# Patient Record
Sex: FEMALE | Race: WHITE | NOT HISPANIC OR LATINO | Employment: PART TIME | URBAN - NONMETROPOLITAN AREA
[De-identification: names, ages, dates, MRNs, and addresses within clinical notes are randomized per-mention and may not be internally consistent; named-entity substitution may affect disease eponyms.]

---

## 2018-12-27 ENCOUNTER — OFFICE VISIT (OUTPATIENT)
Dept: FAMILY MEDICINE CLINIC | Facility: CLINIC | Age: 23
End: 2018-12-27
Payer: COMMERCIAL

## 2018-12-27 VITALS
HEIGHT: 61 IN | WEIGHT: 163.4 LBS | SYSTOLIC BLOOD PRESSURE: 124 MMHG | BODY MASS INDEX: 30.85 KG/M2 | DIASTOLIC BLOOD PRESSURE: 82 MMHG

## 2018-12-27 DIAGNOSIS — Z02.4 DRIVER'S PERMIT PE (PHYSICAL EXAMINATION): ICD-10-CM

## 2018-12-27 DIAGNOSIS — Z23 NEED FOR VACCINATION: ICD-10-CM

## 2018-12-27 DIAGNOSIS — F33.1 MODERATE EPISODE OF RECURRENT MAJOR DEPRESSIVE DISORDER (HCC): Primary | ICD-10-CM

## 2018-12-27 DIAGNOSIS — Z00.00 WELL ADULT EXAM: ICD-10-CM

## 2018-12-27 PROCEDURE — 90471 IMMUNIZATION ADMIN: CPT | Performed by: PHYSICIAN ASSISTANT

## 2018-12-27 PROCEDURE — 99385 PREV VISIT NEW AGE 18-39: CPT | Performed by: PHYSICIAN ASSISTANT

## 2018-12-27 PROCEDURE — 90715 TDAP VACCINE 7 YRS/> IM: CPT | Performed by: PHYSICIAN ASSISTANT

## 2018-12-27 NOTE — PROGRESS NOTES
Assessment/Plan:    Will start Cathy on Sertraline for MDD  I've asked her to come back in 1 month for a recheck OV or sooner if needed  Diagnoses and all orders for this visit:    Moderate episode of recurrent major depressive disorder (HCC)  -     sertraline (ZOLOFT) 50 mg tablet; Take 1 tablet (50 mg total) by mouth daily    Well adult exam    Need for vaccination  -     TDAP VACCINE GREATER THAN OR EQUAL TO 8YO IM          Subjective:      Patient ID: Osvaldo Hughes is a 21 y o  female  Lei Hickman is here today to become an established patient in our office  She is requesting a 's permit physical today as well as talking about MDD since the death of her mother 3 years ago  Lei Hickman has both good and bad days, but she feels that the bad days are slowly overtaking the good  She has a 3year old son at home to raise  She's never had SI/HI  The following portions of the patient's history were reviewed and updated as appropriate:   She  has a past medical history of Asthma  She   Patient Active Problem List    Diagnosis Date Noted    Moderate episode of recurrent major depressive disorder ([facility] [address] ) 12/27/2018     She  has no past surgical history on file  Her family history includes Asthma in her mother; Diabetes in her mother; Other in her maternal grandmother and mother  She  reports that she has never smoked  She has never used smokeless tobacco  She reports that she does not drink alcohol or use drugs  Current Outpatient Prescriptions   Medication Sig Dispense Refill    sertraline (ZOLOFT) 50 mg tablet Take 1 tablet (50 mg total) by mouth daily 30 tablet 0     No current facility-administered medications for this visit        Current Outpatient Prescriptions on File Prior to Visit   Medication Sig    [DISCONTINUED] acetaminophen (TYLENOL) 325 mg tablet Take 2 tablets by mouth every 6 (six) hours as needed for mild pain or fever    [DISCONTINUED] ibuprofen (MOTRIN) 600 mg tablet Take 1 tablet by mouth every 6 (six) hours as needed for mild pain for up to 7 days     No current facility-administered medications on file prior to visit  She is allergic to cefdinir; ceftin [cefuroxime]; dust mite extract; niacin; nystatin; and pollen extract       Review of Systems   Constitutional: Negative for activity change, appetite change, chills, diaphoresis, fatigue, fever and unexpected weight change  HENT: Negative for congestion, ear pain, postnasal drip, rhinorrhea, sinus pain, sinus pressure, sneezing, sore throat, tinnitus and voice change  Eyes: Negative for pain, redness and visual disturbance  Respiratory: Negative for cough, chest tightness, shortness of breath and wheezing  Cardiovascular: Negative for chest pain, palpitations and leg swelling  Gastrointestinal: Negative for abdominal pain, blood in stool, constipation, diarrhea, nausea and vomiting  Genitourinary: Negative for difficulty urinating, dysuria, frequency, hematuria and urgency  Musculoskeletal: Negative for arthralgias, back pain, gait problem, joint swelling, myalgias, neck pain and neck stiffness  Skin: Negative for color change, pallor, rash and wound  Neurological: Negative for dizziness, tremors, weakness, light-headedness and headaches  Psychiatric/Behavioral: Positive for dysphoric mood  Negative for self-injury, sleep disturbance and suicidal ideas  The patient is not nervous/anxious  Objective:      /82 (BP Location: Left arm, Patient Position: Sitting)   Ht 5' 1" (1 549 m)   Wt 74 1 kg (163 lb 6 4 oz)   BMI 30 87 kg/m²          Physical Exam   Constitutional: She is oriented to person, place, and time  She appears well-developed and well-nourished  No distress  HENT:   Head: Normocephalic and atraumatic     Right Ear: Hearing, tympanic membrane, external ear and ear canal normal    Left Ear: Hearing, tympanic membrane, external ear and ear canal normal    Mouth/Throat: Uvula is midline, oropharynx is clear and moist and mucous membranes are normal  No oropharyngeal exudate  Eyes: Conjunctivae are normal  Right eye exhibits no discharge  Left eye exhibits no discharge  No scleral icterus  Neck: Neck supple  Carotid bruit is not present  No thyromegaly present  Cardiovascular: Normal rate, regular rhythm and normal heart sounds  No murmur heard  Pulmonary/Chest: Effort normal and breath sounds normal  No respiratory distress  She has no wheezes  Abdominal: Soft  Bowel sounds are normal  She exhibits no distension and no mass  There is no tenderness  There is no rebound and no guarding  Musculoskeletal: Normal range of motion  She exhibits no edema or tenderness  Lymphadenopathy:     She has no cervical adenopathy  Neurological: She is alert and oriented to person, place, and time  Skin: Skin is warm and dry  No rash noted  She is not diaphoretic  No erythema  Psychiatric: She has a normal mood and affect  Her behavior is normal  Judgment and thought content normal    Vitals reviewed

## 2019-01-29 ENCOUNTER — APPOINTMENT (OUTPATIENT)
Dept: RADIOLOGY | Facility: MEDICAL CENTER | Age: 24
End: 2019-01-29
Payer: COMMERCIAL

## 2019-01-29 ENCOUNTER — OFFICE VISIT (OUTPATIENT)
Dept: FAMILY MEDICINE CLINIC | Facility: CLINIC | Age: 24
End: 2019-01-29
Payer: COMMERCIAL

## 2019-01-29 ENCOUNTER — TRANSCRIBE ORDERS (OUTPATIENT)
Dept: RADIOLOGY | Facility: MEDICAL CENTER | Age: 24
End: 2019-01-29

## 2019-01-29 VITALS
WEIGHT: 166.4 LBS | HEIGHT: 61 IN | SYSTOLIC BLOOD PRESSURE: 134 MMHG | DIASTOLIC BLOOD PRESSURE: 78 MMHG | BODY MASS INDEX: 31.42 KG/M2

## 2019-01-29 DIAGNOSIS — F33.1 MODERATE EPISODE OF RECURRENT MAJOR DEPRESSIVE DISORDER (HCC): ICD-10-CM

## 2019-01-29 DIAGNOSIS — M79.641 RIGHT HAND PAIN: ICD-10-CM

## 2019-01-29 DIAGNOSIS — M79.641 RIGHT HAND PAIN: Primary | ICD-10-CM

## 2019-01-29 PROCEDURE — 99214 OFFICE O/P EST MOD 30 MIN: CPT | Performed by: PHYSICIAN ASSISTANT

## 2019-01-29 PROCEDURE — 3008F BODY MASS INDEX DOCD: CPT | Performed by: PHYSICIAN ASSISTANT

## 2019-01-29 PROCEDURE — 73130 X-RAY EXAM OF HAND: CPT

## 2019-01-29 NOTE — PROGRESS NOTES
Assessment/Plan:    Will check imaging R hand and continue Zoloft 50 mg daily  Return to office in 3 months or sooner PRN  Will call her with results of Xrays  Diagnoses and all orders for this visit:    Right hand pain  -     XR hand 3+ vw right; Future    Moderate episode of recurrent major depressive disorder (HCC)  -     sertraline (ZOLOFT) 50 mg tablet; Take 1 tablet (50 mg total) by mouth daily          Subjective:      Patient ID: Oren Carmona is a 21 y o  female  Guerda Sanchez is here today for a 1 month follow up for depression  She's been taking Zoloft 50 mg daily without adverse effect  She is doing extremely well with the Zoloft and feels that she has not been depressed "at all" since starting the Zoloft  Also, she complains of 2 weeks of R thumb pain  She fixes machinery at work  Denies having any specific injury/trauma to the thumb  Movement/palpation at base of R thumb makes pain worse  Area has not swelled or turned ecchymotic  The following portions of the patient's history were reviewed and updated as appropriate:   She  has a past medical history of Asthma  She   Patient Active Problem List    Diagnosis Date Noted    Moderate episode of recurrent major depressive disorder ([facility] [address] ) 2018     She  has a past surgical history that includes  section ()  Her family history includes Asthma in her mother; Diabetes in her mother; Other in her maternal grandmother and mother  She  reports that she has never smoked  She has never used smokeless tobacco  She reports that she does not drink alcohol or use drugs  Current Outpatient Prescriptions   Medication Sig Dispense Refill    sertraline (ZOLOFT) 50 mg tablet Take 1 tablet (50 mg total) by mouth daily 90 tablet 0     No current facility-administered medications for this visit        Current Outpatient Prescriptions on File Prior to Visit   Medication Sig    [DISCONTINUED] sertraline (ZOLOFT) 50 mg tablet Take 1 tablet (50 mg total) by mouth daily     No current facility-administered medications on file prior to visit  She is allergic to cefdinir; ceftin [cefuroxime]; dust mite extract; niacin; nystatin; and pollen extract       Review of Systems   Constitutional: Negative for activity change, appetite change, chills, diaphoresis, fatigue, fever and unexpected weight change  HENT: Negative for congestion, ear pain, postnasal drip, rhinorrhea, sinus pain, sinus pressure, sneezing, sore throat, tinnitus and voice change  Eyes: Negative for pain, redness and visual disturbance  Respiratory: Negative for cough, chest tightness, shortness of breath and wheezing  Cardiovascular: Negative for chest pain, palpitations and leg swelling  Gastrointestinal: Negative for abdominal pain, blood in stool, constipation, diarrhea, nausea and vomiting  Genitourinary: Negative for difficulty urinating, dysuria, frequency, hematuria and urgency  Musculoskeletal: Positive for arthralgias  Negative for back pain, gait problem, joint swelling, myalgias, neck pain and neck stiffness  Skin: Negative for color change, pallor, rash and wound  Neurological: Negative for dizziness, tremors, weakness, light-headedness and headaches  Psychiatric/Behavioral: Negative for dysphoric mood, self-injury, sleep disturbance and suicidal ideas  The patient is not nervous/anxious  Objective:      /78 (BP Location: Left arm, Patient Position: Sitting)   Ht 5' 1" (1 549 m)   Wt 75 5 kg (166 lb 6 4 oz)   BMI 31 44 kg/m²          Physical Exam   Constitutional: She is oriented to person, place, and time  She appears well-developed and well-nourished  No distress  HENT:   Head: Normocephalic and atraumatic     Right Ear: Hearing, tympanic membrane, external ear and ear canal normal    Left Ear: Hearing, tympanic membrane, external ear and ear canal normal    Mouth/Throat: Uvula is midline, oropharynx is clear and moist and mucous membranes are normal  No oropharyngeal exudate  Eyes: Conjunctivae are normal  Right eye exhibits no discharge  Left eye exhibits no discharge  No scleral icterus  Neck: Neck supple  Carotid bruit is not present  No thyromegaly present  Cardiovascular: Normal rate, regular rhythm and normal heart sounds  No murmur heard  Pulmonary/Chest: Effort normal and breath sounds normal  No respiratory distress  She has no wheezes  Abdominal: Soft  Bowel sounds are normal  She exhibits no distension and no mass  There is no tenderness  There is no rebound and no guarding  Musculoskeletal: Normal range of motion  She exhibits no edema  Right hand: She exhibits tenderness and bony tenderness  She exhibits normal range of motion and no swelling  Normal sensation noted  Normal strength noted  Hands:  Lymphadenopathy:     She has no cervical adenopathy  Neurological: She is alert and oriented to person, place, and time  Skin: Skin is warm and dry  No rash noted  She is not diaphoretic  No erythema  Psychiatric: She has a normal mood and affect  Her behavior is normal  Judgment and thought content normal    Vitals reviewed

## 2019-02-08 DIAGNOSIS — M79.641 RIGHT HAND PAIN: Primary | ICD-10-CM

## 2019-03-15 ENCOUNTER — APPOINTMENT (EMERGENCY)
Dept: CT IMAGING | Facility: HOSPITAL | Age: 24
End: 2019-03-15
Payer: COMMERCIAL

## 2019-03-15 ENCOUNTER — HOSPITAL ENCOUNTER (EMERGENCY)
Facility: HOSPITAL | Age: 24
Discharge: HOME | End: 2019-03-15
Attending: EMERGENCY MEDICINE | Admitting: EMERGENCY MEDICINE
Payer: COMMERCIAL

## 2019-03-15 VITALS
RESPIRATION RATE: 14 BRPM | DIASTOLIC BLOOD PRESSURE: 68 MMHG | HEIGHT: 61 IN | SYSTOLIC BLOOD PRESSURE: 111 MMHG | OXYGEN SATURATION: 98 % | BODY MASS INDEX: 30.78 KG/M2 | WEIGHT: 163 LBS | HEART RATE: 91 BPM | TEMPERATURE: 98.4 F

## 2019-03-15 DIAGNOSIS — R51.9 HEADACHE: ICD-10-CM

## 2019-03-15 DIAGNOSIS — S06.0X9A CONCUSSION: Primary | ICD-10-CM

## 2019-03-15 PROCEDURE — 70450 CT HEAD/BRAIN W/O DYE: CPT

## 2019-03-15 PROCEDURE — 99284 EMERGENCY DEPT VISIT MOD MDM: CPT

## 2019-03-15 RX ORDER — ONDANSETRON 4 MG/1
4 TABLET, ORALLY DISINTEGRATING ORAL ONCE
Status: COMPLETED | OUTPATIENT
Start: 2019-03-15 | End: 2019-03-15

## 2019-03-15 RX ORDER — ACETAMINOPHEN 325 MG/1
650 TABLET ORAL ONCE
Status: COMPLETED | OUTPATIENT
Start: 2019-03-15 | End: 2019-03-15

## 2019-03-15 RX ORDER — ONDANSETRON 4 MG/1
4 TABLET, FILM COATED ORAL EVERY 8 HOURS PRN
Qty: 12 TABLET | Refills: 0 | Status: SHIPPED | OUTPATIENT
Start: 2019-03-15 | End: 2020-10-29

## 2019-03-15 RX ADMIN — ACETAMINOPHEN 650 MG: 325 TABLET, FILM COATED ORAL at 20:07

## 2019-03-15 RX ADMIN — ONDANSETRON 4 MG: 4 TABLET, ORALLY DISINTEGRATING ORAL at 20:07
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
## 2019-05-01 ENCOUNTER — TELEPHONE (OUTPATIENT)
Dept: FAMILY MEDICINE CLINIC | Facility: CLINIC | Age: 24
End: 2019-05-01

## 2020-03-11 ENCOUNTER — HOSPITAL ENCOUNTER (EMERGENCY)
Facility: HOSPITAL | Age: 25
Discharge: HOME | End: 2020-03-11
Attending: EMERGENCY MEDICINE | Admitting: EMERGENCY MEDICINE
Payer: COMMERCIAL

## 2020-03-11 VITALS
TEMPERATURE: 97.9 F | BODY MASS INDEX: 29.66 KG/M2 | OXYGEN SATURATION: 98 % | WEIGHT: 157.13 LBS | DIASTOLIC BLOOD PRESSURE: 81 MMHG | HEIGHT: 61 IN | HEART RATE: 89 BPM | RESPIRATION RATE: 18 BRPM | SYSTOLIC BLOOD PRESSURE: 131 MMHG

## 2020-03-11 DIAGNOSIS — F33.1 MODERATE EPISODE OF RECURRENT MAJOR DEPRESSIVE DISORDER (HCC): ICD-10-CM

## 2020-03-11 DIAGNOSIS — S06.0X9A CONCUSSION: ICD-10-CM

## 2020-03-11 DIAGNOSIS — L25.9 CONTACT DERMATITIS: Primary | ICD-10-CM

## 2020-03-11 DIAGNOSIS — R51.9 HEADACHE: ICD-10-CM

## 2020-03-11 PROCEDURE — 99282 EMERGENCY DEPT VISIT SF MDM: CPT

## 2020-03-11 PROCEDURE — 99284 EMERGENCY DEPT VISIT MOD MDM: CPT | Performed by: PHYSICIAN ASSISTANT

## 2020-03-11 RX ORDER — PREDNISONE 20 MG/1
40 TABLET ORAL DAILY
Qty: 10 TABLET | Refills: 0 | Status: SHIPPED | OUTPATIENT
Start: 2020-03-11 | End: 2020-03-16

## 2020-03-11 RX ORDER — DIPHENHYDRAMINE HCL 25 MG
25 TABLET ORAL EVERY 6 HOURS
Qty: 20 TABLET | Refills: 0 | Status: SHIPPED | OUTPATIENT
Start: 2020-03-11 | End: 2021-01-25

## 2020-03-11 RX ORDER — PREDNISONE 20 MG/1
40 TABLET ORAL DAILY
Qty: 10 TABLET | Refills: 0 | Status: SHIPPED | OUTPATIENT
Start: 2020-03-11 | End: 2020-03-11 | Stop reason: SDUPTHER

## 2020-03-11 RX ORDER — DIPHENHYDRAMINE HCL 25 MG
25 TABLET ORAL EVERY 6 HOURS
Qty: 20 TABLET | Refills: 0 | Status: SHIPPED | OUTPATIENT
Start: 2020-03-11 | End: 2020-03-11 | Stop reason: SDUPTHER

## 2020-03-11 NOTE — ED PROVIDER NOTES
History  Chief Complaint   Patient presents with    Rash     Pt reports rash to lower anterior neck taht started Friday  Pt reports rash to forearms slight, and up towards face  Pt denies any new soaps, meds or other exposure  Pt reports rash itching and no longer does  20-year-old female presenting today with a pruritic rash over the past 5 days that started on her arms now is on her chest and face  States that the rash will come and go  She has had this before in the past when she started living with another family member however at has been few months since she has had this  Has not been taking any medication for her symptoms  States that currently the rash is not itchy  She otherwise is feeling well without any other complaints  Does not recall any new soaps or at medication or other exposures however she does live in a new place  Denies nausea vomiting, chest pain, shortness breath, difficulty swelling, facial swelling, abdominal pain, diarrhea, constipation  Prior to Admission Medications   Prescriptions Last Dose Informant Patient Reported? Taking?   ondansetron (ZOFRAN) 4 mg tablet   No No   Sig: Take 1 tablet (4 mg total) by mouth every 8 (eight) hours as needed for nausea or vomiting   sertraline (ZOLOFT) 50 mg tablet   No No   Sig: Take 1 tablet (50 mg total) by mouth daily      Facility-Administered Medications: None       Past Medical History:   Diagnosis Date    Asthma     Depression        Past Surgical History:   Procedure Laterality Date     SECTION         Family History   Problem Relation Age of Onset    Asthma Mother     Other Mother     Diabetes Mother     Other Maternal Grandmother      I have reviewed and agree with the history as documented      E-Cigarette/Vaping    E-Cigarette Use Never User      E-Cigarette/Vaping Substances     Social History     Tobacco Use    Smoking status: Never Smoker    Smokeless tobacco: Never Used   Substance Use Topics    Alcohol use: No    Drug use: No       Review of Systems   Constitutional: Negative  HENT: Negative  Eyes: Negative  Respiratory: Negative  Cardiovascular: Negative  Gastrointestinal: Negative  Genitourinary: Negative  Musculoskeletal: Negative  Skin: Positive for rash  Negative for color change, pallor and wound  Neurological: Negative  All other systems reviewed and are negative  Physical Exam  Physical Exam   Constitutional: She is oriented to person, place, and time  She appears well-developed and well-nourished  No distress  HENT:   Head: Normocephalic and atraumatic  Right Ear: External ear normal    Left Ear: External ear normal    Nose: Nose normal    Mouth/Throat: Oropharynx is clear and moist  No oropharyngeal exudate  No erythema noted  No swelling, exudate, or uvula deviation noted of mouth  No discoloration, asymmetries or swelling of the face  No ulcerations, fluctuance, induration or drainage noted  No petechiae noted on palate  Able to swallow without difficulty  Full ROM of jaw  Eyes: Pupils are equal, round, and reactive to light  Conjunctivae are normal  Right eye exhibits no discharge  Left eye exhibits no discharge  No scleral icterus  Neck: Normal range of motion  Neck supple  No tracheal deviation present  Cardiovascular: Normal rate, regular rhythm, normal heart sounds and intact distal pulses  Exam reveals no friction rub  No murmur heard  Pulmonary/Chest: Effort normal and breath sounds normal  No stridor  No respiratory distress  She has no wheezes  She has no rales  She exhibits no tenderness  SpO2 is 98%, within normal limits, resting comfortably  No cyanosis or pallor  Abdominal: Soft  Bowel sounds are normal  She exhibits no distension  There is no tenderness  There is no rebound and no guarding  Lymphadenopathy:     She has no cervical adenopathy     Neurological: She is alert and oriented to person, place, and time  Skin: Skin is warm and dry  Capillary refill takes less than 2 seconds  Rash noted  She is not diaphoretic  No erythema  No pallor  No sandpaper rash noted  No other rashes noted  Good coloration of skin  Nursing note and vitals reviewed  Vital Signs  ED Triage Vitals [03/11/20 1324]   Temperature Pulse Respirations Blood Pressure SpO2   97 9 °F (36 6 °C) 89 18 131/81 98 %      Temp Source Heart Rate Source Patient Position - Orthostatic VS BP Location FiO2 (%)   Tympanic Monitor Lying Right arm --      Pain Score       --           Vitals:    03/11/20 1324   BP: 131/81   Pulse: 89   Patient Position - Orthostatic VS: Lying         Visual Acuity      ED Medications  Medications - No data to display    Diagnostic Studies  Results Reviewed     None                 No orders to display              Procedures  Procedures         ED Course                                 MDM  Number of Diagnoses or Management Options  Diagnosis management comments: Contact dermatitis rash noted on exam, will treat  Patient given education regarding allergic reactions  Patient is informed to return to the emergency department for worsening of symptoms such as throat closing, difficulty breathing, chest pain or shortness of breath and was given proper education regarding their diagnosis and symptoms  Otherwise the patient is informed to follow up with their primary care doctor for re-evaluation  The patient verbalizes understanding and agrees with above assessment and plan  All questions were answered  Please Note: Fluency Direct voice recognition software may have been used in the creation of this document  Wrong words or sound a like substitutions may have occurred due to the inherent limitations of the voice software             Amount and/or Complexity of Data Reviewed  Review and summarize past medical records: yes  Independent visualization of images, tracings, or specimens: yes          Disposition  Final diagnoses:   Contact dermatitis     Time reflects when diagnosis was documented in both MDM as applicable and the Disposition within this note     Time User Action Codes Description Comment    3/11/2020  1:43 PM Alejandraness Klinechristin Add [L25 9] Contact dermatitis       ED Disposition     ED Disposition Condition Date/Time Comment    Discharge Stable Wed Mar 11, 2020  1:43 PM Gianna Núñez discharge to home/self care  Follow-up Information     Follow up With Specialties Details Why Contact Info Additional [address] Emergency Department Emergency Medicine Go to  If symptoms worsen such as difficulty breathing, throat closing sensation etc [address] [place] [address]  [phone] [facility] ED, [facility], [id number]          Patient's Medications   Discharge Prescriptions    DIPHENHYDRAMINE (BENADRYL) 25 MG TABLET    Take 1 tablet (25 mg total) by mouth every 6 (six) hours       Start Date: 3/11/2020 End Date: --       Order Dose: 25 mg       Quantity: 20 tablet    Refills: 0    PREDNISONE 20 MG TABLET    Take 2 tablets (40 mg total) by mouth daily for 5 days       Start Date: 3/11/2020 End Date: 3/16/2020       Order Dose: 40 mg       Quantity: 10 tablet    Refills: 0     No discharge procedures on file      PDMP Review     None          ED Provider  Electronically Signed by           Daniela Pulido PA-C  03/11/20 6275

## 2020-10-29 ENCOUNTER — OFFICE VISIT (OUTPATIENT)
Dept: URGENT CARE | Facility: CLINIC | Age: 25
End: 2020-10-29
Payer: COMMERCIAL

## 2020-10-29 VITALS
RESPIRATION RATE: 18 BRPM | TEMPERATURE: 97.7 F | SYSTOLIC BLOOD PRESSURE: 110 MMHG | HEART RATE: 82 BPM | DIASTOLIC BLOOD PRESSURE: 70 MMHG | WEIGHT: 168.2 LBS | OXYGEN SATURATION: 99 % | HEIGHT: 61 IN | BODY MASS INDEX: 31.75 KG/M2

## 2020-10-29 DIAGNOSIS — R09.81 NASAL CONGESTION: Primary | ICD-10-CM

## 2020-10-29 PROCEDURE — 99213 OFFICE O/P EST LOW 20 MIN: CPT | Performed by: PHYSICIAN ASSISTANT

## 2021-01-24 ENCOUNTER — OFFICE VISIT (OUTPATIENT)
Dept: URGENT CARE | Facility: CLINIC | Age: 26
End: 2021-01-24
Payer: COMMERCIAL

## 2021-01-24 VITALS
BODY MASS INDEX: 33.96 KG/M2 | WEIGHT: 173 LBS | RESPIRATION RATE: 18 BRPM | OXYGEN SATURATION: 97 % | HEART RATE: 63 BPM | SYSTOLIC BLOOD PRESSURE: 136 MMHG | DIASTOLIC BLOOD PRESSURE: 75 MMHG | HEIGHT: 60 IN | TEMPERATURE: 99.2 F

## 2021-01-24 DIAGNOSIS — T78.40XA ALLERGIC REACTION, INITIAL ENCOUNTER: Primary | ICD-10-CM

## 2021-01-24 PROCEDURE — 99213 OFFICE O/P EST LOW 20 MIN: CPT | Performed by: PHYSICIAN ASSISTANT

## 2021-01-24 RX ORDER — METHYLPREDNISOLONE 4 MG/1
TABLET ORAL
Qty: 1 EACH | Refills: 0 | Status: SHIPPED | OUTPATIENT
Start: 2021-01-24 | End: 2021-01-25

## 2021-01-24 NOTE — PROGRESS NOTES
[facility]        NAME: Trisha Ragland is a 22 y o  female  : 1995    MRN: [id number]  DATE: 2021  TIME: 2:16 PM    Assessment and Plan   Allergic reaction, initial encounter Nena Mancilla  1  Allergic reaction, initial encounter  methylPREDNISolone 4 MG tablet therapy pack     Patient Instructions     Take the steroid as directed with food and water  While on this medication do not take any NSAIDs including ibuprofen (Advil), naproxen (Aleve), etc   Avoid caffeinated beverages while taking this medication  Begin taking Benadryl as reviewed  Apply ice to rash areas for 20 30 minutes at a time, as needed for swelling and discomfort  Avoid warm water and hot temperatures as this can increase itching and discomfort  Follow up with family doctor in 3-5 days  Proceed to the ER if symptoms worsen  Chief Complaint     Chief Complaint   Patient presents with    Allergic Reaction     patient complains of a rash on her neck, arms, face and chest starting 5 days ago  Tried benadryl cream with no relief  Believes it was new makeup that caused this reaction  Denies any mouth, tongue or throat swelling and SOB  History of Present Illness   23 y/o female presenting with complaint of allergic reaction x5 days  Reports she used a new setting spray for makeup that was aerosolized and a few days after beginning use developed a red itchy rash of the lower face, neck, and chest   Discontinued use immediately after noting the rash, however reports continued red bumpy rash of these areas that has begun peeling over the past 1-2 days  She attempted treating with Benadryl gel but found no relief and is wondering if this is what caused peeling of the rash  Rash is itchy  Denies any sensation of throat swelling or closing, chest pain, shortness of breath, headaches or dizziness  No stomach upset  No other treatments tried    No other new soaps, lotions, detergents, foods, medications, or products  Review of Systems   Review of Systems   Constitutional: Negative for chills, diaphoresis and fever  HENT: Negative for sore throat and trouble swallowing  Respiratory: Negative for cough, shortness of breath and wheezing  Gastrointestinal: Negative for nausea and vomiting  Neurological: Negative for dizziness and headaches  Current Medications       Current Outpatient Medications:     diphenhydrAMINE (BENADRYL) 25 mg tablet, Take 1 tablet (25 mg total) by mouth every 6 (six) hours (Patient taking differently: Take 25 mg by mouth every 6 (six) hours as needed ), Disp: 20 tablet, Rfl: 0    methylPREDNISolone 4 MG tablet therapy pack, Use as directed on package, Disp: 1 each, Rfl: 0    Current Allergies     Allergies as of 2021 - Reviewed 2021   Allergen Reaction Noted    Cefdinir Other (See Comments) 05/15/2014    Ceftin [cefuroxime] Other (See Comments) 2016    Dust mite extract  2014    Nystatin Other (See Comments) 2016    Pollen extract  2014            The following portions of the patient's history were reviewed and updated as appropriate: allergies, current medications, past family history, past medical history, past social history, past surgical history and problem list      Past Medical History:   Diagnosis Date    Asthma     childhood    Depression        Past Surgical History:   Procedure Laterality Date     SECTION         Family History   Problem Relation Age of Onset   Mouna Seller Asthma Mother     Other Mother     Diabetes Mother     Other Maternal Grandmother          Medications have been verified  Objective   /75 (BP Location: Left arm, Patient Position: Sitting, Cuff Size: Standard)   Pulse 63   Temp 99 2 °F (37 3 °C) (Tympanic)   Resp 18   Ht 5' (1 524 m)   Wt 78 5 kg (173 lb)   LMP 2021   SpO2 97%   BMI 33 79 kg/m²   Patient's last menstrual period was 2021         Physical Exam Physical Exam  Vitals signs and nursing note reviewed  Constitutional:       General: She is not in acute distress  Appearance: She is well-developed  She is not ill-appearing or diaphoretic  HENT:      Head: Normocephalic and atraumatic  Eyes:      General: Lids are normal       Conjunctiva/sclera: Conjunctivae normal    Cardiovascular:      Rate and Rhythm: Normal rate and regular rhythm  Pulmonary:      Effort: Pulmonary effort is normal       Breath sounds: Normal breath sounds  No decreased breath sounds, wheezing, rhonchi or rales  Skin:     General: Skin is warm and dry  Comments: Raised, red, urticarial rash spread diffusely across the cheeks, chin, neck, and shoulders  Rash pattern does appear consistent with that of the aerosolized spray  No pustules, vesicles, or signs of secondary infection  Neurological:      General: No focal deficit present  Mental Status: She is alert  She is not disoriented  Cranial Nerves: Cranial nerves are intact  Coordination: Coordination normal       Gait: Gait normal    Psychiatric:         Behavior: Behavior normal  Behavior is cooperative  Thought Content:  Thought content normal          Judgment: Judgment normal

## 2021-01-24 NOTE — PATIENT INSTRUCTIONS
Take the steroid as directed with food and water  While on this medication do not take any NSAIDs including ibuprofen (Advil), naproxen (Aleve), etc   Avoid caffeinated beverages while taking this medication  Begin taking Benadryl as reviewed  Apply ice to rash areas for 20 30 minutes at a time, as needed for swelling and discomfort  Avoid warm water and hot temperatures as this can increase itching and discomfort  Follow up with family doctor in 3-5 days  Proceed to the ER if symptoms worsen

## 2021-01-24 NOTE — LETTER
January 24, 2021     Patient: Brittney Way   YOB: 1995   Date of Visit: 1/24/2021       To Whom It May Concern: It is my medical opinion that Ruby Marie should remain out of work until 1/27/2021 or sooner if feeling better  If you have any questions or concerns, please don't hesitate to call           Sincerely,        Jefry Ely PA-C

## 2021-01-25 ENCOUNTER — HOSPITAL ENCOUNTER (EMERGENCY)
Facility: HOSPITAL | Age: 26
Discharge: HOME | End: 2021-01-25
Attending: EMERGENCY MEDICINE
Payer: COMMERCIAL

## 2021-01-25 VITALS
TEMPERATURE: 97.3 F | RESPIRATION RATE: 20 BRPM | SYSTOLIC BLOOD PRESSURE: 139 MMHG | OXYGEN SATURATION: 98 % | HEIGHT: 60 IN | WEIGHT: 174 LBS | HEART RATE: 91 BPM | BODY MASS INDEX: 34.16 KG/M2 | DIASTOLIC BLOOD PRESSURE: 69 MMHG

## 2021-01-25 DIAGNOSIS — R21 RASH: Primary | ICD-10-CM

## 2021-01-25 DIAGNOSIS — L01.00 IMPETIGO: ICD-10-CM

## 2021-01-25 PROCEDURE — 99284 EMERGENCY DEPT VISIT MOD MDM: CPT | Performed by: EMERGENCY MEDICINE

## 2021-01-25 PROCEDURE — 99283 EMERGENCY DEPT VISIT LOW MDM: CPT

## 2021-01-25 RX ORDER — PREDNISONE 10 MG/1
20 TABLET ORAL DAILY
Qty: 10 TABLET | Refills: 0 | Status: SHIPPED | OUTPATIENT
Start: 2021-01-25 | End: 2021-01-30

## 2021-01-25 RX ORDER — PREDNISONE 10 MG/1
20 TABLET ORAL DAILY
Qty: 10 TABLET | Refills: 0 | Status: SHIPPED | OUTPATIENT
Start: 2021-01-25 | End: 2021-01-25 | Stop reason: SDUPTHER

## 2021-01-25 RX ORDER — PREDNISONE 20 MG/1
40 TABLET ORAL ONCE
Status: COMPLETED | OUTPATIENT
Start: 2021-01-25 | End: 2021-01-25

## 2021-01-25 RX ADMIN — PREDNISONE 40 MG: 20 TABLET ORAL at 12:20

## 2021-01-25 NOTE — Clinical Note
Cassi Barton was seen and treated in our emergency department on 1/25/2021  Diagnosis:     Blade Addison  may return to work on return date  She may return on this date: 01/27/2021         If you have any questions or concerns, please don't hesitate to call        Iman Borrero DO    ______________________________           _______________          _______________  Hospital Representative                              Date                                Time

## 2021-01-25 NOTE — ED PROVIDER NOTES
History  Chief Complaint   Patient presents with    Rash     Pt reports she started with an itchy rash 6 days ago after using a new body spray  Patient reports she tried using benadryl ointment 3 days ago and the rash got worse and started peeling  Red raised rough rash noted to patient's B/L arms, chest, neck and face  Patient skin on her neck and face Red and oozing   Allergic Reaction     77-year-old female states she used a spray from her skin products that apparently caused a rash on her skin  She states since that time she has been trying some Benadryl cream which has not been controlling her rash  She states now that she has some open oozing areas on her neck up into her chin  No fevers or chills no other complaints at this point  History provided by:  Patient   used: No        None       Past Medical History:   Diagnosis Date    Asthma     childhood    Depression        Past Surgical History:   Procedure Laterality Date     SECTION         Family History   Problem Relation Age of Onset    Asthma Mother     Other Mother     Diabetes Mother     Other Maternal Grandmother      I have reviewed and agree with the history as documented  E-Cigarette/Vaping    E-Cigarette Use Never User      E-Cigarette/Vaping Substances    Nicotine No     THC No     CBD No     Flavoring No     Other No     Unknown No      Social History     Tobacco Use    Smoking status: Never Smoker    Smokeless tobacco: Never Used   Substance Use Topics    Alcohol use: No    Drug use: No       Review of Systems   Constitutional: Negative for activity change, chills, diaphoresis and fever  HENT: Negative for congestion, ear pain, nosebleeds, sore throat, trouble swallowing and voice change  Eyes: Negative for pain, discharge and redness  Respiratory: Negative for apnea, cough, choking, shortness of breath, wheezing and stridor      Cardiovascular: Negative for chest pain and palpitations  Gastrointestinal: Negative for abdominal distention, abdominal pain, constipation, diarrhea, nausea and vomiting  Endocrine: Negative for polydipsia  Genitourinary: Negative for difficulty urinating, dysuria, flank pain, frequency, hematuria and urgency  Musculoskeletal: Negative for back pain, gait problem, joint swelling, myalgias, neck pain and neck stiffness  Skin: Positive for color change, rash and wound  Negative for pallor  Neurological: Negative for dizziness, tremors, syncope, speech difficulty, weakness, numbness and headaches  Hematological: Negative for adenopathy  Psychiatric/Behavioral: Negative for confusion, hallucinations, self-injury and suicidal ideas  The patient is not nervous/anxious  Physical Exam  Physical Exam  Vitals signs and nursing note reviewed  Constitutional:       General: She is not in acute distress  Appearance: She is well-developed  She is not diaphoretic  HENT:      Head: Normocephalic and atraumatic  Right Ear: External ear normal       Left Ear: External ear normal       Nose: Nose normal    Eyes:      Conjunctiva/sclera: Conjunctivae normal       Pupils: Pupils are equal, round, and reactive to light  Neck:      Musculoskeletal: Normal range of motion and neck supple  Cardiovascular:      Rate and Rhythm: Normal rate and regular rhythm  Heart sounds: Normal heart sounds  Pulmonary:      Effort: Pulmonary effort is normal       Breath sounds: Normal breath sounds  Abdominal:      General: Bowel sounds are normal       Palpations: Abdomen is soft  Musculoskeletal: Normal range of motion  Skin:     General: Skin is warm and dry  Neurological:      Mental Status: She is alert and oriented to person, place, and time  Deep Tendon Reflexes: Reflexes are normal and symmetric  Psychiatric:         Behavior: Behavior is cooperative           Vital Signs  ED Triage Vitals [01/25/21 1144]   Temperature Pulse Respirations Blood Pressure SpO2   (!) 97 3 °F (36 3 °C) 91 20 139/69 98 %      Temp Source Heart Rate Source Patient Position - Orthostatic VS BP Location FiO2 (%)   Tympanic Monitor Sitting Right arm --      Pain Score       8           Vitals:    01/25/21 1144   BP: 139/69   Pulse: 91   Patient Position - Orthostatic VS: Sitting         Visual Acuity      ED Medications  Medications   predniSONE tablet 40 mg (40 mg Oral Given 1/25/21 1220)       Diagnostic Studies  Results Reviewed     None                 No orders to display              Procedures  Procedures         ED Course                                           MDM  Number of Diagnoses or Management Options  Impetigo:   Rash:   Diagnosis management comments: Patient has what looks to be a rash secondary to exposure to the skin care spray that she was  Superimposed upon this is areas of appetite ago on her anterior neck going from her chest up to her chin  There is honey-crusted lesion in the area as well as some oozing  Disposition  Final diagnoses:   Rash   Impetigo     Time reflects when diagnosis was documented in both MDM as applicable and the Disposition within this note     Time User Action Codes Description Comment    1/25/2021 12:06 PM Franchesca Kimball Add [R21] Rash     1/25/2021 12:06 PM Franchesca Kimball Add [L01 00] Impetigo       ED Disposition     ED Disposition Condition Date/Time Comment    Discharge Stable Mon Jan 25, 2021 12:06 PM Gabino Ferguson discharge to home/self care              Follow-up Information     Follow up With Specialties Details Why Contact Info Additional Information    [facility] Emergency Department Emergency Medicine Schedule an appointment as soon as possible for a visit  As needed [facility] [id number] [address] Emergency Department, [place], [place], [address] [facility] Emergency Department Emergency Medicine  As needed [address] [address]  [address] [address] Emergency Department, [place], [place], [address]    [facility] Family Medicine Schedule an appointment as soon as possible for a visit  As needed [address]  [phone]             Current Discharge Medication List      START taking these medications    Details   mupirocin (BACTROBAN) 2 % ointment Apply topically 3 (three) times a day  Qty: 15 g, Refills: 0    Associated Diagnoses: Rash; Impetigo      predniSONE 10 mg tablet Take 2 tablets (20 mg total) by mouth daily for 5 days  Qty: 10 tablet, Refills: 0    Associated Diagnoses: Rash; Impetigo           No discharge procedures on file      PDMP Review     None          ED Provider  Electronically Signed by           Anthony Meza DO  01/25/21 [id number]

## 2021-01-25 NOTE — Clinical Note
Leticia Laguna was seen and treated in our emergency department on 1/25/2021  Diagnosis:     Patsy Ley  may return to work on return date  She may return on this date: 01/27/2021         If you have any questions or concerns, please don't hesitate to call        Kellee Hill DO    ______________________________           _______________          _______________  Hospital Representative                              Date                                Time

## 2021-02-12 ENCOUNTER — OFFICE VISIT (OUTPATIENT)
Dept: FAMILY MEDICINE CLINIC | Facility: CLINIC | Age: 26
End: 2021-02-12
Payer: COMMERCIAL

## 2021-02-12 VITALS
BODY MASS INDEX: 34.95 KG/M2 | WEIGHT: 178 LBS | TEMPERATURE: 96.5 F | RESPIRATION RATE: 18 BRPM | DIASTOLIC BLOOD PRESSURE: 70 MMHG | HEIGHT: 60 IN | SYSTOLIC BLOOD PRESSURE: 126 MMHG | OXYGEN SATURATION: 100 % | HEART RATE: 83 BPM

## 2021-02-12 DIAGNOSIS — F33.1 MODERATE EPISODE OF RECURRENT MAJOR DEPRESSIVE DISORDER (HCC): ICD-10-CM

## 2021-02-12 DIAGNOSIS — Z00.00 ANNUAL PHYSICAL EXAM: Primary | ICD-10-CM

## 2021-02-12 DIAGNOSIS — E66.9 OBESITY (BMI 30-39.9): ICD-10-CM

## 2021-02-12 DIAGNOSIS — H91.90 HEARING LOSS, UNSPECIFIED HEARING LOSS TYPE, UNSPECIFIED LATERALITY: ICD-10-CM

## 2021-02-12 DIAGNOSIS — Z23 ENCOUNTER FOR IMMUNIZATION: ICD-10-CM

## 2021-02-12 DIAGNOSIS — H53.9 VISION CHANGES: ICD-10-CM

## 2021-02-12 PROCEDURE — 99385 PREV VISIT NEW AGE 18-39: CPT | Performed by: FAMILY MEDICINE

## 2021-02-12 PROCEDURE — 90682 RIV4 VACC RECOMBINANT DNA IM: CPT | Performed by: FAMILY MEDICINE

## 2021-02-12 PROCEDURE — 90471 IMMUNIZATION ADMIN: CPT | Performed by: FAMILY MEDICINE

## 2021-02-12 NOTE — PROGRESS NOTES
[address] [facility]    NAME: Wyatt Snow  AGE: 22 y o  SEX: female  : 1995     DATE: 2021     Assessment and Plan:     Patient is a single mother of 1 son without any living parents of her own with worsening obesity, depression, hearing and vision changes  Patient was referred to weight management, counseling services and social work, ENT (hearing test came back abnormal), and optometry  Patient will follow-up in 1 month to reassess her situation and follow-up on her lab work  Patient will also schedule a visit for her Pap test     Discussed the importance of physical activity and dietary changes to improve her weight and her depression  Recommended omega-3 and vitamin-D supplementation  Patient is willing to try consuming reduced fat milk and replacing fruit juice with real foods  Patient has also downloaded apps on her phone to provide her with home workout routines, which she agreed to partake on days she is not working  Problem List Items Addressed This Visit     None      Visit Diagnoses     Annual physical exam    -  Primary    Relevant Orders    Basic metabolic panel    Lipid Panel with Direct LDL reflex    TSH, 3rd generation    T4, free    Obesity (BMI 30-39  9)        Relevant Orders    Basic metabolic panel    Lipid Panel with Direct LDL reflex    Ambulatory referral to Weight Management    TSH, 3rd generation    T4, free    Vision changes        Relevant Orders    Ambulatory referral to Optometry    Encounter for immunization        Relevant Orders    influenza vaccine, quadrivalent, recombinant, PF, 0 5 mL, for patients 18 yr+ (FLUBLOK) (Completed)    Hearing loss, unspecified hearing loss type, unspecified laterality        Relevant Orders    Ambulatory Referral to Otolaryngology          Immunizations and preventive care screenings were discussed with patient today   Appropriate education was printed on patient's after visit summary  Discussed with patientsthe benefits, contraindications and side effects of the following vaccines: Influenza   Discussed 1 components of the vaccine/s  Counseling:  · Exercise: the importance of regular exercise/physical activity was discussed  Recommend exercise 3-5 times per week for at least 30 minutes  BMI Counseling: Body mass index is 34 76 kg/m²  The BMI is above normal  Nutrition recommendations include encouraging healthy choices of fruits and vegetables, consuming healthier snacks, moderation in carbohydrate intake and reducing intake of saturated and trans fat  Exercise recommendations include exercising 3-5 times per week and obtaining a gym membership  Patient referred to weight management due to patient being overweight  Return in about 1 month (around 3/12/2021) for Recheck weight management and depression & anxiety       Chief Complaint:     Chief Complaint   Patient presents with    Physical Exam    [facility]     hospital follow up      History of Present Illness:     Adult Annual Physical   Patient here for a comprehensive physical exam  The patient reports the following problems:     Impetigo  - duration: week-week and a half  - resolved: 2 weeks ago  - inciting event: new make up spray  - initially normal allergic reaction with hives and pruritis  - initially rash on hands, face, and mask   - Urgent Care gave medicine for allergic reaction  - benadryl gel didn't help, and the rash was spreading and worsening in swelling and scaling  [facility] ED: received Bactroban and an oral antibiotic    Asthma  · Current regimen: none  · Dyspnea: after 5 minutes playing with son   · Wheezing: none  · Cough: none  · Precipitating factors: none   · Frequency of albuterol use: none  · Asthma exacerbations: none    Diet and Physical Activity  · Diet/Nutrition: once a meal, breakfast or lunch, TV dinner (turkey/veg/dinner), cereal (Special K) with whole milk, bagels, chicken nuggets, orange juice, cheese its  Yogurt  · Changing to reduced fat milk, replacing real fruits   · Exercise: no formal exercise and fat burning workout & female fitness, home fitness roxanna workout apps  · 150 lbs 3 years ago      Depression Screening  PHQ-9 Depression Screening    PHQ-9:   Frequency of the following problems over the past two weeks:      Little interest or pleasure in doing things: 1 - several days  Feeling down, depressed, or hopeless: 1 - several days  Trouble falling or staying asleep, or sleeping too much: 3 - nearly every day  Feeling tired or having little energy: 3 - nearly every day  Poor appetite or overeating: 3 - nearly every day  Feeling bad about yourself - or that you are a failure or have let yourself or your family down: 0 - not at all  Trouble concentrating on things, such as reading the newspaper or watching television: 3 - nearly every day  Moving or speaking so slowly that other people could have noticed  Or the opposite - being so fidgety or restless that you have been moving around a lot more than usual: 0 - not at all  Thoughts that you would be better off dead, or of hurting yourself in some way: 0 - not at all  PHQ-2 Score: 2  PHQ-9 Score: 14       General Health  · Sleep: gets 4-6 hours of sleep on average, experiences daytime hypersomnolence and unrefreshing sleep  · Hearing: believes it is worse  · Vision: vision problems: blurry vison  · Dental: no dental visits for >1 year, brushes teeth twice daily and does not floss  · Depression: 3 months ago   · Was on medication and made her gain weight and feel worse   Zoloft for about a month   · Endorse social anxiety - fear of calling people on phone at work and meeting large group of people  · Substance use: neg x3   · Immunization:  Flu due    Social Hx:   · Single mom with 10 yr old son   · Mother , father estranged, no siblings  · Limited social support  · 1st year in community college  · Work part time at [employer]  · Last menstrual period: 2021  · Contraceptive method: none  · History of STDs?: no   · Pap test: none  · Family history of breast cancer: unknown      Review of Systems:     Review of Systems   Constitutional: Positive for appetite change (decreased appetite), fatigue and unexpected weight change (gained significant weight)  Eyes: Positive for visual disturbance (vision is worse )  Respiratory: Positive for shortness of breath (with exertion)  Negative for cough and wheezing  Cardiovascular: Negative for chest pain, palpitations and leg swelling  Gastrointestinal: Negative for abdominal pain  Genitourinary: Negative for menstrual problem  Skin: Negative for rash  Neurological: Negative for light-headedness  Psychiatric/Behavioral: Positive for decreased concentration, dysphoric mood and sleep disturbance (sleep deprived)  Negative for self-injury and suicidal ideas  The patient is nervous/anxious  The patient is not hyperactive  All other systems reviewed and are negative       Past Medical History:     Past Medical History:   Diagnosis Date    Asthma     childhood    Depression       Past Surgical History:     Past Surgical History:   Procedure Laterality Date     SECTION        Social History:     E-Cigarette/Vaping    E-Cigarette Use Never User      E-Cigarette/Vaping Substances    Nicotine No     THC No     CBD No     Flavoring No     Other No     Unknown No      Social History     Socioeconomic History    Marital status: Single     Spouse name: None    Number of children: None    Years of education: None    Highest education level: None   Occupational History    None   Social Needs    Financial resource strain: None    Food insecurity     Worry: None     Inability: None    Transportation needs     Medical: None     Non-medical: None   Tobacco Use    Smoking status: Never Smoker    Smokeless tobacco: Never Used   Substance and Sexual Activity    Alcohol use: No    Drug use: No    Sexual activity: Yes     Comment: History of unprotected sex  Oral sex  Lifestyle    Physical activity     Days per week: None     Minutes per session: None    Stress: None   Relationships    Social connections     Talks on phone: None     Gets together: None     Attends Worship service: None     Active member of club or organization: None     Attends meetings of clubs or organizations: None     Relationship status: None    Intimate partner violence     Fear of current or ex partner: None     Emotionally abused: None     Physically abused: None     Forced sexual activity: None   Other Topics Concern    None   Social History Narrative    None      Family History:     Family History   Problem Relation Age of Onset    Asthma Mother     Other Mother     Diabetes Mother     Other Maternal Grandmother       Current Medications:     Current Outpatient Medications   Medication Sig Dispense Refill    mupirocin (BACTROBAN) 2 % ointment Apply topically 3 (three) times a day 15 g 0     No current facility-administered medications for this visit  Allergies: Allergies   Allergen Reactions    Cefdinir Other (See Comments)     Unknown - childhood    Ceftin [Cefuroxime] Other (See Comments)     Unknown - childhood    Dust Mite Extract     Nystatin Other (See Comments)     Unknown - childhood    Pollen Extract       Physical Exam:     /70 (BP Location: Left arm, Patient Position: Sitting, Cuff Size: Adult)   Pulse 83   Temp (!) 96 5 °F (35 8 °C)   Resp 18   Ht 5' (1 524 m)   Wt 80 7 kg (178 lb)   LMP 01/21/2021 (Within Days)   SpO2 100%   BMI 34 76 kg/m²     Physical Exam  Vitals signs reviewed  Constitutional:       General: She is not in acute distress  Appearance: She is obese  She is not ill-appearing or diaphoretic  HENT:      Right Ear: Decreased hearing noted   A middle ear effusion is present  Left Ear: Decreased hearing noted  Tympanic membrane is perforated and retracted  Cardiovascular:      Rate and Rhythm: Normal rate and regular rhythm  Pulses: Normal pulses  Pulmonary:      Effort: Pulmonary effort is normal  No respiratory distress  Breath sounds: Normal breath sounds  No wheezing  Musculoskeletal:         General: No swelling  Right lower leg: No edema  Left lower leg: No edema  Skin:     General: Skin is warm  Capillary Refill: Capillary refill takes less than 2 seconds  Findings: No erythema or rash  Neurological:      Mental Status: She is alert  Psychiatric:         Attention and Perception: Attention normal          Mood and Affect: Mood is depressed  Affect is blunt and tearful  Speech: Speech normal          Behavior: Behavior normal  Behavior is cooperative  Thought Content: Thought content normal  Thought content does not include suicidal ideation  Thought content does not include suicidal plan           Cognition and Memory: Cognition normal          Judgment: Judgment normal           Yue Orlando MD   [address]

## 2021-02-12 NOTE — PROGRESS NOTES
Assessment/Plan:         {[facility]}      Subjective:      Patient ID: Melissa Neves is a 22 y o  female  Impetigo    - duration: week-week and a half  - resolved: 2 weeks ago  - inciting event: new make up spray  - initially normal allergic reaction with hives and pruritis  - benadryl gel didn't help, and the rash was getting worse  - initially rash on hands, face, and mask   - Urgent Care gave medicine for allergic reaction:   [facility] ED: received Bactroban and an oral antibiotic        {Common ambulatory SmartLinks:44651}    Review of Systems      Objective:      /70 (BP Location: Left arm, Patient Position: Sitting, Cuff Size: Adult)   Pulse 83   Temp (!) 96 5 °F (35 8 °C)   Resp 18   Ht 5' (1 524 m)   Wt 80 7 kg (178 lb)   LMP 01/21/2021 (Within Days)   SpO2 100%   BMI 34 76 kg/m²          Physical Exam  HENT:      Right Ear: A middle ear effusion is present  Left Ear: Tympanic membrane is perforated and retracted

## 2021-02-12 NOTE — PATIENT INSTRUCTIONS
1  Vit D 2000 IU and Omega-3 2000 mg (2 g) daily   2  Go to lab for Routine Blood work   3  Schedule appointment with Weight Management and Optometry and Ear doctor   4  Establish care with a therapist  5  Exercise on days you're not working   6  Eat real fruits, eat more plants, switch to low fat milk or plant-based milk   7  Schedule a pap test for cervical cancer screening on Thursday afternoons   8  Follow Up Visit in a month     Wellness Visit for Adults   AMBULATORY CARE:   A wellness visit  is when you see your healthcare provider to get screened for health problems  Your healthcare provider will also give you advice on how to stay healthy  Write down your questions so you remember to ask them  Ask your healthcare provider how often you should have a wellness visit  What happens at a wellness visit:  Your healthcare provider will ask about your health, and your family history of health problems  This includes high blood pressure, heart disease, and cancer  He or she will ask if you have symptoms that concern you, if you smoke, and about your mood  You may also be asked about your intake of medicines, supplements, food, and alcohol  Any of the following may be done:  · Your weight  will be checked  Your height may also be checked so your body mass index (BMI) can be calculated  Your BMI shows if you are at a healthy weight  · Your blood pressure  and heart rate will be checked  Your temperature may also be checked  · Blood and urine tests  may be done  Blood tests may be done to check your cholesterol levels  Abnormal cholesterol levels increase your risk for heart disease and stroke  You may also need a blood or urine test to check for diabetes if you are at increased risk  Urine tests may be done to look for signs of an infection or kidney disease  · A physical exam  includes checking your heartbeat and lungs with a stethoscope   Your healthcare provider may also check your skin to look for sun damage  · Screening tests  may be recommended  A screening test is done to check for diseases that may not cause symptoms  The screening tests you may need depend on your age, gender, family history, and lifestyle habits  For example, colorectal screening may be recommended if you are 48years old or older  Screening tests you need if you are a woman:   · A Pap smear  is used to screen for cervical cancer  Pap smears are usually done every 3 to 5 years depending on your age  You may need them more often if you have had abnormal Pap smear test results in the past  Ask your healthcare provider how often you should have a Pap smear  · A mammogram  is an x-ray of your breasts to screen for breast cancer  Experts recommend mammograms every 2 years starting at age 48 years  You may need a mammogram at age 52 years or younger if you have an increased risk for breast cancer  Talk to your healthcare provider about when you should start having mammograms and how often you need them  Vaccines you may need:   · Get an influenza vaccine  every year  The influenza vaccine protects you from the flu  Several types of viruses cause the flu  The viruses change over time, so new vaccines are made each year  · Get a tetanus-diphtheria (Td) booster vaccine  every 10 years  This vaccine protects you against tetanus and diphtheria  Tetanus is a severe infection that may cause painful muscle spasms and lockjaw  Diphtheria is a severe bacterial infection that causes a thick covering in the back of your mouth and throat  · Get a human papillomavirus (HPV) vaccine  if you are female and aged 23 to 32 or male 23 to 24 and never received it  This vaccine protects you from HPV infection  HPV is the most common infection spread by sexual contact  HPV may also cause vaginal, penile, and anal cancers  · Get a pneumococcal vaccine  if you are aged 72 years or older   The pneumococcal vaccine is an injection given to protect you from pneumococcal disease  Pneumococcal disease is an infection caused by pneumococcal bacteria  The infection may cause pneumonia, meningitis, or an ear infection  · Get a shingles vaccine  if you are 60 or older, even if you have had shingles before  The shingles vaccine is an injection to protect you from the varicella-zoster virus  This is the same virus that causes chickenpox  Shingles is a painful rash that develops in people who had chickenpox or have been exposed to the virus  How to eat healthy:  My Plate is a model for planning healthy meals  It shows the types and amounts of foods that should go on your plate  Fruits and vegetables make up about half of your plate, and grains and protein make up the other half  A serving of dairy is included on the side of your plate  The amount of calories and serving sizes you need depends on your age, gender, weight, and height  Examples of healthy foods are listed below:  · Eat a variety of vegetables  such as dark green, red, and orange vegetables  You can also include canned vegetables low in sodium (salt) and frozen vegetables without added butter or sauces  · Eat a variety of fresh fruits , canned fruit in 100% juice, frozen fruit, and dried fruit  · Include whole grains  At least half of the grains you eat should be whole grains  Examples include whole-wheat bread, wheat pasta, brown rice, and whole-grain cereals such as oatmeal     · Eat a variety of protein foods such as seafood (fish and shellfish), lean meat, and poultry without skin (turkey and chicken)  Examples of lean meats include pork leg, shoulder, or tenderloin, and beef round, sirloin, tenderloin, and extra lean ground beef  Other protein foods include eggs and egg substitutes, beans, peas, soy products, nuts, and seeds  · Choose low-fat dairy products such as skim or 1% milk or low-fat yogurt, cheese, and cottage cheese      · Limit unhealthy fats  such as butter, hard margarine, and shortening  Exercise:  Exercise at least 30 minutes per day on most days of the week  Some examples of exercise include walking, biking, dancing, and swimming  You can also fit in more physical activity by taking the stairs instead of the elevator or parking farther away from stores  Include muscle strengthening activities 2 days each week  Regular exercise provides many health benefits  It helps you manage your weight, and decreases your risk for type 2 diabetes, heart disease, stroke, and high blood pressure  Exercise can also help improve your mood  Ask your healthcare provider about the best exercise plan for you  General health and safety guidelines:   · Do not smoke  Nicotine and other chemicals in cigarettes and cigars can cause lung damage  Ask your healthcare provider for information if you currently smoke and need help to quit  E-cigarettes or smokeless tobacco still contain nicotine  Talk to your healthcare provider before you use these products  · Limit alcohol  A drink of alcohol is 12 ounces of beer, 5 ounces of wine, or 1½ ounces of liquor  · Lose weight, if needed  Being overweight increases your risk of certain health conditions  These include heart disease, high blood pressure, type 2 diabetes, and certain types of cancer  · Protect your skin  Do not sunbathe or use tanning beds  Use sunscreen with a SPF 15 or higher  Apply sunscreen at least 15 minutes before you go outside  Reapply sunscreen every 2 hours  Wear protective clothing, hats, and sunglasses when you are outside  · Drive safely  Always wear your seatbelt  Make sure everyone in your car wears a seatbelt  A seatbelt can save your life if you are in an accident  Do not use your cell phone when you are driving  This could distract you and cause an accident  Pull over if you need to make a call or send a text message  · Practice safe sex  Use latex condoms if are sexually active and have more than one partner  Your healthcare provider may recommend screening tests for sexually transmitted infections (STIs)  · Wear helmets, lifejackets, and protective gear  Always wear a helmet when you ride a bike or motorcycle, go skiing, or play sports that could cause a head injury  Wear protective equipment when you play sports  Wear a lifejacket when you are on a boat or doing water sports  © Copyright [address] Information is for End User's use only and may not be sold, redistributed or otherwise used for commercial purposes  All illustrations and images included in CareNotes® are the copyrighted property of A D A M , Inc  or [address] [place], [place] [address]   The above information is an  only  It is not intended as medical advice for individual conditions or treatments  Talk to your doctor, nurse or pharmacist before following any medical regimen to see if it is safe and effective for you

## 2021-02-17 ENCOUNTER — PATIENT OUTREACH (OUTPATIENT)
Dept: FAMILY MEDICINE CLINIC | Facility: CLINIC | Age: 26
End: 2021-02-17

## 2021-02-17 DIAGNOSIS — Z59.9 FINANCIAL DIFFICULTIES: Primary | ICD-10-CM

## 2021-02-17 SDOH — ECONOMIC STABILITY - INCOME SECURITY: PROBLEM RELATED TO HOUSING AND ECONOMIC CIRCUMSTANCES, UNSPECIFIED: Z59.9

## 2021-02-17 NOTE — PROGRESS NOTES
RIVAS GRAVES received referral from Dr Kimberli Thompson, reason for referral unclear  RIVAS GRAVES called patient ([phone]) and introduced role and completed assessment  RIVAS GRAVES confirmed patient's contact information and address  Patient is single, employed part time and works at [employer] in [place]  [employer] makes between $200-$450 bi-weekly  In addition, patient receives $792 in unemployment bi-weekly  Patient is also a part time nursing student at [employer]  Patient receives $206 a month in food stamps  Patient has a [de-identified] year old child who attends school in a hybrid model currently  When patient is at work or school her son goes to   Patient does not have any social supports, no substance abuse history, no legal issues and is not a   Patient stated that she has a history of MDD and possibly social anxiety  No current concerns of SI  Patient is not currently seeing a psychiatrist  Patient confirmed that she received list of OP [address] providers from last office visit  RIVAS GRAVES confirmed patient has [facility]  Patient has enough food in her home and has a car to transport herself  Patient stated she can make [address] appointment on her own but is agreeable to RIVAS GRAVES following up regarding in the future  RIVAS GRAVES will remain available for ongoing support and consults

## 2021-03-09 ENCOUNTER — PATIENT OUTREACH (OUTPATIENT)
Dept: FAMILY MEDICINE CLINIC | Facility: CLINIC | Age: 26
End: 2021-03-09

## 2021-03-09 NOTE — PROGRESS NOTES
RIVAS GRAVES attempted to call patient ([phone]) to follow up with patient becoming established with OP MH services  Patient did not answer, and received a message stating patient unavailable now, unable to leave message  RIVAS GRAVES will continue to follow up regarding

## 2021-04-02 ENCOUNTER — OFFICE VISIT (OUTPATIENT)
Dept: URGENT CARE | Facility: CLINIC | Age: 26
End: 2021-04-02
Payer: COMMERCIAL

## 2021-04-02 VITALS
DIASTOLIC BLOOD PRESSURE: 61 MMHG | RESPIRATION RATE: 18 BRPM | HEART RATE: 71 BPM | SYSTOLIC BLOOD PRESSURE: 125 MMHG | BODY MASS INDEX: 34.55 KG/M2 | HEIGHT: 60 IN | TEMPERATURE: 97.4 F | WEIGHT: 176 LBS | OXYGEN SATURATION: 99 %

## 2021-04-02 DIAGNOSIS — R42 VERTIGO: Primary | ICD-10-CM

## 2021-04-02 PROCEDURE — 99213 OFFICE O/P EST LOW 20 MIN: CPT | Performed by: FAMILY MEDICINE

## 2021-04-02 PROCEDURE — 3725F SCREEN DEPRESSION PERFORMED: CPT | Performed by: FAMILY MEDICINE

## 2021-04-02 PROCEDURE — 1036F TOBACCO NON-USER: CPT | Performed by: FAMILY MEDICINE

## 2021-04-02 RX ORDER — MECLIZINE HCL 12.5 MG/1
12.5 TABLET ORAL 3 TIMES DAILY PRN
Qty: 21 TABLET | Refills: 0 | Status: SHIPPED | OUTPATIENT
Start: 2021-04-02 | End: 2022-04-27

## 2021-04-02 NOTE — LETTER
April 2, 2021     Patient: Paige Cardenas   YOB: 1995   Date of Visit: 4/2/2021       To Whom It May Concern:    Deric Raines was evaluated in my office on 04/02/2021  Please excuse her absence  May return to work on 04/03/2021  If you have any questions or concerns, please don't hesitate to call           Sincerely,        Tara Cook MD    CC: No Recipients
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
## 2021-04-13 ENCOUNTER — PATIENT OUTREACH (OUTPATIENT)
Dept: FAMILY MEDICINE CLINIC | Facility: CLINIC | Age: 26
End: 2021-04-13

## 2021-04-13 NOTE — PROGRESS NOTES
RIVAS GRAVES attempted to call patient a second time ([phone]) to follow up with patient becoming established with OP MH services  Patient did not answer, and received a message stating patient unavailable now, unable to leave message  RIVAS GRAVES will continue to follow up regarding

## 2021-04-27 ENCOUNTER — OFFICE VISIT (OUTPATIENT)
Dept: FAMILY MEDICINE CLINIC | Facility: CLINIC | Age: 26
End: 2021-04-27
Payer: COMMERCIAL

## 2021-04-27 VITALS
OXYGEN SATURATION: 98 % | WEIGHT: 173.3 LBS | SYSTOLIC BLOOD PRESSURE: 112 MMHG | HEIGHT: 60 IN | TEMPERATURE: 97.7 F | DIASTOLIC BLOOD PRESSURE: 50 MMHG | BODY MASS INDEX: 34.02 KG/M2 | HEART RATE: 91 BPM | RESPIRATION RATE: 18 BRPM

## 2021-04-27 DIAGNOSIS — Z11.1 ENCOUNTER FOR PPD TEST: Primary | ICD-10-CM

## 2021-04-27 PROCEDURE — 3008F BODY MASS INDEX DOCD: CPT | Performed by: FAMILY MEDICINE

## 2021-04-27 PROCEDURE — 86580 TB INTRADERMAL TEST: CPT

## 2021-04-29 ENCOUNTER — CLINICAL SUPPORT (OUTPATIENT)
Dept: FAMILY MEDICINE CLINIC | Facility: CLINIC | Age: 26
End: 2021-04-29

## 2021-04-29 DIAGNOSIS — Z11.1 ENCOUNTER FOR PPD SKIN TEST READING: Primary | ICD-10-CM

## 2021-04-29 LAB
INDURATION: 0 MM
TB SKIN TEST: NEGATIVE

## 2021-05-10 ENCOUNTER — PATIENT OUTREACH (OUTPATIENT)
Dept: FAMILY MEDICINE CLINIC | Facility: CLINIC | Age: 26
End: 2021-05-10

## 2021-05-10 ENCOUNTER — TELEPHONE (OUTPATIENT)
Dept: FAMILY MEDICINE CLINIC | Facility: CLINIC | Age: 26
End: 2021-05-10

## 2021-05-10 ENCOUNTER — TELEMEDICINE (OUTPATIENT)
Dept: FAMILY MEDICINE CLINIC | Facility: CLINIC | Age: 26
End: 2021-05-10
Payer: COMMERCIAL

## 2021-05-10 DIAGNOSIS — F41.9 ANXIETY: ICD-10-CM

## 2021-05-10 DIAGNOSIS — F32.2 SEVERE DEPRESSION (HCC): Primary | ICD-10-CM

## 2021-05-10 PROCEDURE — 3725F SCREEN DEPRESSION PERFORMED: CPT | Performed by: FAMILY MEDICINE

## 2021-05-10 PROCEDURE — 99213 OFFICE O/P EST LOW 20 MIN: CPT | Performed by: FAMILY MEDICINE

## 2021-05-10 NOTE — LETTER
May 10, 2021     Patient: Osvaldo Hughes   YOB: 1995   Date of Visit: 5/10/2021       To Whom it May Concern:    Kavitha Sanchez is under my professional care  Due to her current medical conditions, we strongly recommend Ms Kavitha Sanchez to be allowed to have support dog with her at home  We appreciate in advance any accommodations on Ms Morgan's behalf  If you have any questions or concerns, please don't hesitate to call           Sincerely,          Shmear Servin MD

## 2021-05-10 NOTE — PROGRESS NOTES
Virtual Brief Visit    Assessment/Plan:    Problem List Items Addressed This Visit     None      Visit Diagnoses     Severe depression ([address] )    -  Primary    Anxiety            1  Severe depression ([facility] [address] )  - Ms Asia Finch is experiencing an episode of severe depression, given the lack of support as she does not have family and her dog is not allowed in there apartment complex  - She has been in contact with  and is on waiting list at [facility]  - Provided letter for apartment complex to allow support dog to live with her   - Discussed with her given her long history of depression since she was a teenager, she will highly benefit from being started on antidepressive medication  Will further discuss with attending and determine best management option  She agreed to weekly visits at office to monitor while she is able to get in at [facility]  2  Anxiety  - As above    Case discussed with Dr Ángel Avila and Dr Derek Sheth  Ample time provided during visit to answer questions  Recommended to call back office with any questions  Patient acknowledged understanding and verbally agreed to plan  Reason for visit is   Chief Complaint   Patient presents with    Virtual Brief Visit        Encounter provider Yasmeen Delarosa MD    Provider located at [address] [place] [id number]    Recent Visits  No visits were found meeting these conditions  Showing recent visits within past 7 days and meeting all other requirements     Today's Visits  Date Type Provider Dept   05/10/21 Telephone Ora Samano   05/10/21 Telemedicine MD True Hood Caro   Showing today's visits and meeting all other requirements     Future Appointments  No visits were found meeting these conditions     Showing future appointments within next 150 days and meeting all other requirements        After connecting through [employer] patient was informed that this is a secure, HIPAA-compliant platform  She agrees to proceed  , the patient was identified by name and date of birth  Camila Diaz was informed that this is a telemedicine visit and that the visit is being conducted through [facility] and patient was informed that this is a secure, HIPAA-compliant platform  She agrees to proceed     My office door was closed  The patient was notified the following individuals were present in the room other providers  She acknowledged consent and understanding of privacy and security of the platform  The patient has agreed to participate and understands she can discontinue the visit at any time  It was my intent to perform this visit via video technology but the patient was not able to do a video connection so the visit was completed via audio telephone only  Patient is aware this is a billable service  Pinky Thomas is a 22 y o  female   HPI     - Ms  Geri Gerber has history of depression since she was a teenage, officially diagnosed on 2018, mother passed away in 2015 when patient  was pregnant  Currently has a 10 y/o son but no other family  She does not know her father as he ran away after her mother had her  Was on zoloft for 2 months but felt like her depression was worsening, has not been on any other medication  She is trying to establish care with therapist and has been working with , however she is on waiting list  States she was recently given a puppy, which has served as a support animal, however at her current complex is not allowed to have it unless she has a note from physician or therapist  Denies hx of eating disorder but her mother told her she had seizures when she was a baby, has not had any since then  She is currently in college but has difficulty getting through her classes due to her behavioral health issues   States she sleeps too much and although she feels hungry she does not have much of an appetite, has difficulty concentrating on tasks  PHQ-9 Depression Screening    PHQ-9:   Frequency of the following problems over the past two weeks:      Little interest or pleasure in doing things: 3 - nearly every day  Feeling down, depressed, or hopeless: 3 - nearly every day  Trouble falling or staying asleep, or sleeping too much: 3 - nearly every day  Feeling tired or having little energy: 3 - nearly every day  Poor appetite or overeating: 3 - nearly every day  Feeling bad about yourself - or that you are a failure or have let yourself or your family down: 1 - several days  Trouble concentrating on things, such as reading the newspaper or watching television: 3 - nearly every day  Moving or speaking so slowly that other people could have noticed  Or the opposite - being so fidgety or restless that you have been moving around a lot more than usual: 3 - nearly every day  Thoughts that you would be better off dead, or of hurting yourself in some way: 0 - not at all  PHQ-2 Score: 6  PHQ-9 Score: 22           Past Medical History:   Diagnosis Date    Asthma     childhood    Depression     Obesity (BMI 30-39  9)        Past Surgical History:   Procedure Laterality Date     SECTION         Current Outpatient Medications   Medication Sig Dispense Refill    meclizine (ANTIVERT) 12 5 MG tablet Take 1 tablet (12 5 mg total) by mouth 3 (three) times a day as needed for dizziness (Patient not taking: Reported on 2021) 21 tablet 0    mupirocin (BACTROBAN) 2 % ointment Apply topically 3 (three) times a day (Patient not taking: Reported on 2021) 15 g 0     No current facility-administered medications for this visit           Allergies   Allergen Reactions    Cefdinir Other (See Comments)     Unknown - childhood    Ceftin [Cefuroxime] Other (See Comments)     Unknown - childhood    Dust Mite Extract     Nystatin Other (See Comments)     Unknown - childhood    Pollen Extract        Review of Systems   Gastrointestinal: Negative for abdominal pain, constipation, diarrhea, nausea and vomiting  Psychiatric/Behavioral: Positive for behavioral problems, decreased concentration and sleep disturbance  Negative for agitation, confusion, self-injury and suicidal ideas  The patient is nervous/anxious  There were no vitals filed for this visit  I spent 25 minutes directly with the patient during this visit    [address] acknowledges that she has consented to an online visit or consultation  She understands that the online visit is based solely on information provided by her, and that, in the absence of a face-to-face physical evaluation by the physician, the diagnosis she receives is both limited and provisional in terms of accuracy and completeness  This is not intended to replace a full medical face-to-face evaluation by the physician  Osvaldo Hughes understands and accepts these terms

## 2021-05-10 NOTE — TELEPHONE ENCOUNTER
Patient lost cell service during visit  She is now at work connected to [employer]  She is available at 230pm on break if you are able to reach back out   If not, she works until [employer] and will also be available tomorrow morning until 11am

## 2021-05-10 NOTE — LETTER
May 10, 2021     Patient: Jose Alberto Barbosa   YOB: 1995   Date of Visit: 5/10/2021       To Whom it May Concern:    Rudy Galaviz is under my professional care  Due to her current medical conditions, we strongly recommend Ms Rudy Galaviz to be allowed to have support dog with her at home  If you have any questions or concerns, please don't hesitate to call           Sincerely,          Zak Saavedra MD

## 2021-05-10 NOTE — PROGRESS NOTES
RIVAS GRAVES received voicemail from patient ([phone])  RIVAS GRAVES called patient back  Patient stated that she is still on the waiting list for [facility] but she cannot get an appointment for another month  Patient stated she has called other offices in the area and has been unable to get a sooner appointment  Patient stated that she would like to have her dog certified as a support animal because she stated her dog helps her feel better  RIVAS GRAVES discussed with patient that all the other offices in the area including integrated behavioral health specialist has a wait until almost July  Patient denies any current or recent SI  RIVAS GRAVES provided patient with [facility], patient agreeable to call if needed  RIVAS GRAVES discussed coping strategies such as breathing exercises and participating in hobbies/ enjoyable activities  Patient stated that she had previously been on medication but it did not work  RIVAS GRAVES encouraged patient that medication is another option for patient to discuss with her PCP until she is able to get an appointment with a therapist   Patient had no other questions or concerns  RIVAS GRAVES encouraged patient to call RIVAS GRAVES as needed

## 2021-05-12 ENCOUNTER — TELEPHONE (OUTPATIENT)
Dept: FAMILY MEDICINE CLINIC | Facility: CLINIC | Age: 26
End: 2021-05-12

## 2021-05-12 DIAGNOSIS — F32.2 SEVERE DEPRESSION (HCC): Primary | ICD-10-CM

## 2021-05-12 RX ORDER — FLUOXETINE 10 MG/1
10 CAPSULE ORAL DAILY
Qty: 30 CAPSULE | Refills: 1 | Status: SHIPPED | OUTPATIENT
Start: 2021-05-12 | End: 2021-05-18 | Stop reason: SDUPTHER

## 2021-05-12 NOTE — TELEPHONE ENCOUNTER
Called back patient, she answered  Discussed we will prescribed fluoxetine, as we cannot confirm hx if hx of seizures was truly a seizure disorder or febrile seizures  Advised to take in the morning  Advised to call back if worsening depression symptoms including suicidal ideation  She agreed with weekly appts while she has appt with [facility]

## 2021-05-12 NOTE — TELEPHONE ENCOUNTER
Hi, I called her back  Bc we are starting her on antidepressant she will need weekly visits for the next 3 weeks until she can establish care with [facility]  She is agreeable  She prefers in the morning 8-10 prior to her picking up her son from school  They can be in person or virtual depending on her preference and if she is able to come in  If you or anyone can call her back to coordinate  Thanks!

## 2021-05-12 NOTE — TELEPHONE ENCOUNTER
Called patient to inform we will prescribe fluoxetine  She will need weekly appointments for follow up  Advised to call back with any concerns

## 2021-05-18 ENCOUNTER — TELEMEDICINE (OUTPATIENT)
Dept: FAMILY MEDICINE CLINIC | Facility: CLINIC | Age: 26
End: 2021-05-18
Payer: COMMERCIAL

## 2021-05-18 ENCOUNTER — TELEPHONE (OUTPATIENT)
Dept: FAMILY MEDICINE CLINIC | Facility: CLINIC | Age: 26
End: 2021-05-18

## 2021-05-18 DIAGNOSIS — F33.1 MODERATE EPISODE OF RECURRENT MAJOR DEPRESSIVE DISORDER (HCC): Primary | ICD-10-CM

## 2021-05-18 DIAGNOSIS — F32.2 SEVERE DEPRESSION (HCC): ICD-10-CM

## 2021-05-18 PROCEDURE — 99212 OFFICE O/P EST SF 10 MIN: CPT | Performed by: FAMILY MEDICINE

## 2021-05-18 RX ORDER — FLUOXETINE 10 MG/1
10 CAPSULE ORAL DAILY
Qty: 30 CAPSULE | Refills: 1 | Status: SHIPPED | OUTPATIENT
Start: 2021-05-18 | End: 2022-04-26

## 2021-05-18 NOTE — TELEPHONE ENCOUNTER
----- Message from Vanda Staff, DO sent at 5/18/2021  9:33 AM EDT -----    May schedule this patient for follow-up in 4-6 weeks after starting Prozac for depression    Left message for Patient to call the office to schedule a Follow up appointment   Informed of office hours and phone number

## 2021-05-18 NOTE — PROGRESS NOTES
Virtual Brief Visit    Assessment/Plan:    2  Moderate episode of recurrent major depressive disorder (HCC)  -FLUoxetine (PROzac) 10 mg capsule; Take 1 capsule (10 mg total) by mouth daily  Dispense: 30 capsule; Refill: 1  - patient states that she has not yet started Prozac as previously prescribed, recent today to her preferred pharmacy in [address] [place] [address]  - patient awaits counselor appointment with [facility],  Provided with our  number if any issues establishing with counselor  - follow-up in 4-6 weeks      Reason for visit is   Chief Complaint   Patient presents with    Virtual Brief Visit        Encounter provider Keiry Ortiz DO    Provider located at [address] [place] [phone]    Recent Visits  Date Type Provider Dept   05/12/21 Telephone Lenore Bah MD [address] recent visits within past 7 days and meeting all other requirements     Today's Visits  Date Type Provider Dept   05/18/21 Telemedicine Sandra Simpson 58 today's visits and meeting all other requirements     Future Appointments  No visits were found meeting these conditions  Showing future appointments within next 150 days and meeting all other requirements        After connecting through [facility] and patient was informed that this is a secure, HIPAA-compliant platform  She agrees to proceed  , the patient was identified by name and date of birth  Rex Medel was informed that this is a telemedicine visit and that the visit is being conducted through [employer] and patient was informed that this is a secure, HIPAA-compliant platform  She agrees to proceed  Other methods to assure confidentiality were taken: no speakerphone  The patient was notified the following individuals were present in the room[facility]  She acknowledged consent and understanding of privacy and security of the platform   The patient has agreed to participate and understands she can discontinue the visit at any time  Patient is aware this is a billable service  Rasheeda Crocker is a 22 y o  female who presents for follow-up depression  Patient had virtual appointment on 05/10/2021 for this complaint  Patient still on waiting list at [facility] for counselor  Patient's dog is now with her at her apartment which she states has certainly had helped her mood, " I feel more happy"  Patient endorses fatigue secondary to insomnia  Patient states that activities that she used to enjoy such as makeup seem like " a chore now"  Denies any thoughts to harm herself or others  Admits to episodes of anger  Past Medical History:   Diagnosis Date    Asthma     childhood    Depression     Obesity (BMI 30-39  9)        Past Surgical History:   Procedure Laterality Date     SECTION         Current Outpatient Medications   Medication Sig Dispense Refill    FLUoxetine (PROzac) 10 mg capsule Take 1 capsule (10 mg total) by mouth daily 30 capsule 1    meclizine (ANTIVERT) 12 5 MG tablet Take 1 tablet (12 5 mg total) by mouth 3 (three) times a day as needed for dizziness (Patient not taking: Reported on 2021) 21 tablet 0    mupirocin (BACTROBAN) 2 % ointment Apply topically 3 (three) times a day (Patient not taking: Reported on 2021) 15 g 0     No current facility-administered medications for this visit  Allergies   Allergen Reactions    Cefdinir Other (See Comments)     Unknown - childhood    Ceftin [Cefuroxime] Other (See Comments)     Unknown - childhood    Dust Mite Extract     Nystatin Other (See Comments)     Unknown - childhood    Pollen Extract        Review of Systems     As noted in HPI    There were no vitals filed for this visit        I spent 15 minutes directly with the patient during this visit    [address] acknowledges that she has consented to an online visit or consultation  She understands that the online visit is based solely on information provided by her, and that, in the absence of a face-to-face physical evaluation by the physician, the diagnosis she receives is both limited and provisional in terms of accuracy and completeness  This is not intended to replace a full medical face-to-face evaluation by the physician  Didier Haji understands and accepts these terms

## 2021-05-24 ENCOUNTER — PATIENT OUTREACH (OUTPATIENT)
Dept: FAMILY MEDICINE CLINIC | Facility: CLINIC | Age: 26
End: 2021-05-24

## 2021-05-24 NOTE — PROGRESS NOTES
RIVAS GRAVES attempted to call patient ([phone]) to follow up regarding medication, OP MH and support animal  Patient did not answer, RIVAS GRAVES left message and will continue to follow up regarding

## 2021-05-26 ENCOUNTER — TELEPHONE (OUTPATIENT)
Dept: FAMILY MEDICINE CLINIC | Facility: CLINIC | Age: 26
End: 2021-05-26

## 2021-05-26 NOTE — TELEPHONE ENCOUNTER
Called back patient, no answer, left message to reschedule appt  Detail Level: Generalized Detail Level: Simple Detail Level: Zone

## 2021-06-03 ENCOUNTER — TELEMEDICINE (OUTPATIENT)
Dept: FAMILY MEDICINE CLINIC | Facility: CLINIC | Age: 26
End: 2021-06-03
Payer: COMMERCIAL

## 2021-06-03 DIAGNOSIS — F33.1 MODERATE EPISODE OF RECURRENT MAJOR DEPRESSIVE DISORDER (HCC): Primary | ICD-10-CM

## 2021-06-03 PROCEDURE — 99212 OFFICE O/P EST SF 10 MIN: CPT | Performed by: FAMILY MEDICINE

## 2021-06-03 NOTE — PROGRESS NOTES
Virtual Regular Visit      Assessment/Plan:    Problem List Items Addressed This Visit        Other    Moderate episode of recurrent major depressive disorder ([facility] [address] ) - Primary          Patient planning to start her prescription for Prozac tonight  She is still on waiting list to establish counseling services with [facility]  Reviewed that medication may take 4-6 weeks to feel full effect and that we would like to follow-up with her then to see how she is doing with the medication , or sooner if she has any problems  Discussed with attending physician Dr Mayo Nayak  Reason for visit is   Chief Complaint   Patient presents with    Virtual Regular Visit        Encounter provider Victorina Ricci DO    Provider located at [address] [place] [phone]      Recent Visits  No visits were found meeting these conditions  Showing recent visits within past 7 days and meeting all other requirements     Today's Visits  Date Type Provider Dept   06/03/21 Telemedicine Victorina Ricci DO [address] today's visits and meeting all other requirements     Future Appointments  No visits were found meeting these conditions  Showing future appointments within next 150 days and meeting all other requirements        The patient was identified by name and date of birth  Osvaldo Hughes was informed that this is a telemedicine visit and that the visit is being conducted through [address] [place] [address] Now and patient was informed that this is a secure, HIPAA-compliant platform  She agrees to proceed     My office door was closed  No one else was in the room  She acknowledged consent and understanding of privacy and security of the video platform  The patient has agreed to participate and understands they can discontinue the visit at any time  Patient is aware this is a billable service  Chantal Melendrez is a 22 y o  female     Patient calling today for follow-up on depression  Last telemedicine visit , was started on Prozac 10 mg at that time, and was awaiting counselor appointment with [facility]  She reports she is still on the waiting list to establish with [facility]  She reports she did  the prescription but has not yet started taking the Prozac  She intends to start taking that tonight  Past Medical History:   Diagnosis Date    Asthma     childhood    Depression     Obesity (BMI 30-39  9)        Past Surgical History:   Procedure Laterality Date     SECTION         Current Outpatient Medications   Medication Sig Dispense Refill    FLUoxetine (PROzac) 10 mg capsule Take 1 capsule (10 mg total) by mouth daily 30 capsule 1    meclizine (ANTIVERT) 12 5 MG tablet Take 1 tablet (12 5 mg total) by mouth 3 (three) times a day as needed for dizziness (Patient not taking: Reported on 2021) 21 tablet 0    mupirocin (BACTROBAN) 2 % ointment Apply topically 3 (three) times a day (Patient not taking: Reported on 2021) 15 g 0     No current facility-administered medications for this visit  Allergies   Allergen Reactions    Cefdinir Other (See Comments)     Unknown - childhood    Ceftin [Cefuroxime] Other (See Comments)     Unknown - childhood    Dust Mite Extract     Nystatin Other (See Comments)     Unknown - childhood    Pollen Extract        Review of Systems   Psychiatric/Behavioral: Positive for dysphoric mood  Video Exam    There were no vitals filed for this visit  Physical Exam  Constitutional:       General: She is not in acute distress  Appearance: She is well-developed  She is not diaphoretic  HENT:      Head: Normocephalic and atraumatic  Right Ear: External ear normal       Left Ear: External ear normal    Pulmonary:      Effort: No respiratory distress  Neurological:      Mental Status: She is alert and oriented to person, place, and time     Psychiatric:         Mood and Affect: Mood normal          Behavior: Behavior normal           I spent 10 minutes directly with the patient during this visit      [address] acknowledges that she has consented to an online visit or consultation  She understands that the online visit is based solely on information provided by her, and that, in the absence of a face-to-face physical evaluation by the physician, the diagnosis she receives is both limited and provisional in terms of accuracy and completeness  This is not intended to replace a full medical face-to-face evaluation by the physician  Jose Alberto Barbosa understands and accepts these terms

## 2021-06-24 ENCOUNTER — PATIENT OUTREACH (OUTPATIENT)
Dept: FAMILY MEDICINE CLINIC | Facility: CLINIC | Age: 26
End: 2021-06-24

## 2021-06-24 NOTE — PROGRESS NOTES
RIVAS GRAVES called patient ([phone]) patient confirmed that she was approved to have emotional support animal in apartment  Patient denies any SI, HI or plan to harm herself or others  Patient stated she had her first appointment last week with [facility] and has another one this week  Patient had no other questions or concerns  RIVAS GRAVES provided patient with [employer]IV[employer] contact information  Please re consult RIVAS GRAVES for any future needs

## 2022-02-15 ENCOUNTER — APPOINTMENT (OUTPATIENT)
Dept: RADIOLOGY | Facility: CLINIC | Age: 27
End: 2022-02-15
Payer: COMMERCIAL

## 2022-02-15 ENCOUNTER — OFFICE VISIT (OUTPATIENT)
Dept: URGENT CARE | Facility: CLINIC | Age: 27
End: 2022-02-15
Payer: COMMERCIAL

## 2022-02-15 VITALS
HEART RATE: 66 BPM | RESPIRATION RATE: 18 BRPM | HEIGHT: 61 IN | BODY MASS INDEX: 33.04 KG/M2 | OXYGEN SATURATION: 98 % | SYSTOLIC BLOOD PRESSURE: 114 MMHG | WEIGHT: 175 LBS | TEMPERATURE: 95.8 F | DIASTOLIC BLOOD PRESSURE: 65 MMHG

## 2022-02-15 DIAGNOSIS — S89.91XA KNEE INJURY, RIGHT, INITIAL ENCOUNTER: Primary | ICD-10-CM

## 2022-02-15 DIAGNOSIS — S89.91XA KNEE INJURY, RIGHT, INITIAL ENCOUNTER: ICD-10-CM

## 2022-02-15 PROCEDURE — 1036F TOBACCO NON-USER: CPT | Performed by: FAMILY MEDICINE

## 2022-02-15 PROCEDURE — 3008F BODY MASS INDEX DOCD: CPT | Performed by: FAMILY MEDICINE

## 2022-02-15 PROCEDURE — 73564 X-RAY EXAM KNEE 4 OR MORE: CPT

## 2022-02-15 PROCEDURE — 99213 OFFICE O/P EST LOW 20 MIN: CPT | Performed by: FAMILY MEDICINE

## 2022-02-15 RX ORDER — NAPROXEN 500 MG/1
500 TABLET ORAL 2 TIMES DAILY WITH MEALS
Qty: 10 TABLET | Refills: 0 | Status: SHIPPED | OUTPATIENT
Start: 2022-02-15 | End: 2022-04-27 | Stop reason: ALTCHOICE

## 2022-02-15 NOTE — LETTER
February 15, 2022     Patient: Rossy Judd   YOB: 1995   Date of Visit: 2/15/2022       To Whom It May Concern:    Trixie Rodriguez was seen in my office on 02/15/2022  Please excuse her absence  May return to work on 02/16/2022  If you have any questions or concerns, please don't hesitate to call           Sincerely,        Juan A Salas MD

## 2022-02-15 NOTE — PROGRESS NOTES
[facility] Now        NAME: Darrel Quarles is a 32 y o  female  : 1995    MRN: [id number]  DATE: February 15, 2022  TIME: 10:10 AM    Assessment and Plan   Knee injury, right, initial encounter [S89 [id number]]  1  Knee injury, right, initial encounter  XR knee 4+ vw right injury    naproxen (Naprosyn) 500 mg tablet     No obvious fractures on x-ray; official read pending  Naproxen prescribed for symptomatic relief  Ace wrap applied  Patient instructed on icing and rest     Patient Instructions     Follow up with PCP in 3-5 days  Proceed to  ER if symptoms worsen  Chief Complaint     Chief Complaint   Patient presents with    Knee Injury     fell directly on rt knee had swelling last night pain in patella          History of Present Illness     26-year-old female presents today due to right knee pain sustained from a fall and direct trauma which occurred yesterday morning  Reports slipping on ice  Reports swelling, ecchymosis and pain with an antalgic gait  Somewhat improved today  Of note, has fallen twice on the right knee over the past 12 months resulting in antalgic gait  Was not evaluated for the prior to injuries  Review of Systems   Review of Systems   Constitutional: Negative for chills and fever  Respiratory: Negative for cough, shortness of breath and wheezing  Cardiovascular: Negative for chest pain  Gastrointestinal: Negative for abdominal pain and nausea  Musculoskeletal: Positive for arthralgias, gait problem and joint swelling  Skin: Positive for color change  Neurological: Negative for dizziness       Current Medications     Current Outpatient Medications:     FLUoxetine (PROzac) 10 mg capsule, Take 1 capsule (10 mg total) by mouth daily, Disp: 30 capsule, Rfl: 1    meclizine (ANTIVERT) 12 5 MG tablet, Take 1 tablet (12 5 mg total) by mouth 3 (three) times a day as needed for dizziness (Patient not taking: Reported on 2021), Disp: 21 tablet, Rfl: 0   mupirocin (BACTROBAN) 2 % ointment, Apply topically 3 (three) times a day (Patient not taking: Reported on 2021), Disp: 15 g, Rfl: 0    naproxen (Naprosyn) 500 mg tablet, Take 1 tablet (500 mg total) by mouth 2 (two) times a day with meals for 5 days, Disp: 10 tablet, Rfl: 0    Current Allergies     Allergies as of 02/15/2022 - Reviewed 02/15/2022   Allergen Reaction Noted    Cefdinir Other (See Comments) 05/15/2014    Ceftin [cefuroxime] Other (See Comments) 2016    Dust mite extract  2014    Nystatin Other (See Comments) 2016    Pollen extract  2014            The following portions of the patient's history were reviewed and updated as appropriate: allergies, current medications, past family history, past medical history, past social history, past surgical history and problem list      Past Medical History:   Diagnosis Date    Asthma     childhood    Depression     Obesity (BMI 30-39  9)        Past Surgical History:   Procedure Laterality Date     SECTION         Family History   Problem Relation Age of Onset    Asthma Mother     Other Mother     Diabetes Mother     Other Maternal Grandmother          Medications have been verified  Objective   /65   Pulse 66   Temp (!) 95 8 °F (35 4 °C)   Resp 18   Ht 5' 1" (1 549 m)   Wt 79 4 kg (175 lb)   LMP 02/15/2022   SpO2 98%   BMI 33 07 kg/m²   Patient's last menstrual period was 02/15/2022  Physical Exam     Physical Exam  Vitals and nursing note reviewed  Constitutional:       General: She is in acute distress  Appearance: Normal appearance  She is not ill-appearing, toxic-appearing or diaphoretic  HENT:      Head: Normocephalic and atraumatic  Eyes:      General:         Right eye: No discharge  Left eye: No discharge        Conjunctiva/sclera: Conjunctivae normal    Pulmonary:      Effort: Pulmonary effort is normal    Musculoskeletal:         General: Swelling, tenderness (Lateral collateral ligament and medial condyle, right knee) and signs of injury present  No deformity  Normal range of motion  Skin:     General: Skin is warm  Findings: No erythema  Neurological:      General: No focal deficit present  Mental Status: She is alert and oriented to person, place, and time  Psychiatric:         Mood and Affect: Mood normal          Behavior: Behavior normal          Thought Content:  Thought content normal          Judgment: Judgment normal

## 2022-04-26 ENCOUNTER — OFFICE VISIT (OUTPATIENT)
Dept: FAMILY MEDICINE CLINIC | Facility: CLINIC | Age: 27
End: 2022-04-26
Payer: COMMERCIAL

## 2022-04-26 VITALS
DIASTOLIC BLOOD PRESSURE: 80 MMHG | WEIGHT: 185 LBS | SYSTOLIC BLOOD PRESSURE: 100 MMHG | RESPIRATION RATE: 16 BRPM | HEIGHT: 61 IN | HEART RATE: 96 BPM | OXYGEN SATURATION: 98 % | BODY MASS INDEX: 34.93 KG/M2 | TEMPERATURE: 97.4 F

## 2022-04-26 DIAGNOSIS — Z11.59 NEED FOR HEPATITIS C SCREENING TEST: ICD-10-CM

## 2022-04-26 DIAGNOSIS — Z01.84 IMMUNITY STATUS TESTING: ICD-10-CM

## 2022-04-26 DIAGNOSIS — Z01.84 ENCOUNTER FOR ANTIBODY RESPONSE EXAMINATION: ICD-10-CM

## 2022-04-26 DIAGNOSIS — Z11.1 SCREENING FOR TUBERCULOSIS: ICD-10-CM

## 2022-04-26 DIAGNOSIS — F33.1 MODERATE EPISODE OF RECURRENT MAJOR DEPRESSIVE DISORDER (HCC): ICD-10-CM

## 2022-04-26 DIAGNOSIS — Z00.00 ANNUAL VISIT FOR GENERAL ADULT MEDICAL EXAMINATION WITHOUT ABNORMAL FINDINGS: Primary | ICD-10-CM

## 2022-04-26 DIAGNOSIS — Z92.29 HISTORY OF VARICELLA VACCINATION: ICD-10-CM

## 2022-04-26 DIAGNOSIS — Z92.29: ICD-10-CM

## 2022-04-26 DIAGNOSIS — Z92.29 HEPATITIS B VACCINE ADMINISTERED AT BIRTH: ICD-10-CM

## 2022-04-26 DIAGNOSIS — Z11.4 SCREENING FOR HIV (HUMAN IMMUNODEFICIENCY VIRUS): ICD-10-CM

## 2022-04-26 PROCEDURE — 3008F BODY MASS INDEX DOCD: CPT | Performed by: FAMILY MEDICINE

## 2022-04-26 PROCEDURE — 1036F TOBACCO NON-USER: CPT | Performed by: FAMILY MEDICINE

## 2022-04-26 PROCEDURE — 3725F SCREEN DEPRESSION PERFORMED: CPT | Performed by: FAMILY MEDICINE

## 2022-04-26 PROCEDURE — 86580 TB INTRADERMAL TEST: CPT

## 2022-04-26 PROCEDURE — 99395 PREV VISIT EST AGE 18-39: CPT | Performed by: FAMILY MEDICINE

## 2022-04-26 NOTE — PROGRESS NOTES
[address] [facility] PRACTICE    NAME: Caryl Dow  AGE: 32 y o  SEX: female  : 1995     DATE: 2022     Assessment and Plan:     1  Annual visit for general adult medical examination without abnormal findings    Preventive Services   Colorectal Cancer Screening: not indicated   Breast Cancer Screening: not indicated   Cervical Cancer Screening: due    Lung Cancer Screening: not indicated   Osteoporosis Screening: not indicated   AAA Screening: not indicated   STD Screening: agreed to HIV, Hep C   Cardiovascular Screening: not indicated    2  Moderate episode of recurrent major depressive disorder (HCC)  On prozac  Well controlled  [facility] [address] [facility] / starting nursing school soon  No SI, no HI, no crisis  3  Encounter for antibody response examination  4  Immunity status testing  5  Measles, mumps, rubella (MMR) vaccination administered 2 years ago or longer  6  History of varicella vaccination  6  Hepatitis B vaccine administered at birth  9  Hepatitis B vaccine administered at birth  - Measles/Mumps/Rubella Immunity  - Hepatitis B surface antibody  - Varicella zoster antibody, IgG; Future  - Varicella zoster antibody, IgG    8  Screening for HIV (human immunodeficiency virus)  - HIV 1/2 Antigen/Antibody (4th Generation) w Reflex [facility]; Future    9  Need for hepatitis C screening test  - Hepatitis C Antibody ([facility]); Future  - Hepatitis C Antibody ([facility])    10  Screening for tuberculosis  - Quantiferon TB Gold Plus; Future      Will complete form once all results received  Immunizations and preventive care screenings were discussed with patient today  Appropriate education was printed on patient's after visit summary  Counseling:  Alcohol/drug use: discussed moderation in alcohol intake, the recommendations for healthy alcohol use, and avoidance of illicit drug use    Dental Health: discussed importance of regular tooth brushing, flossing, and dental visits  Injury prevention: discussed safety/seat belts, safety helmets, smoke detectors, carbon dioxide detectors, and smoking near bedding or upholstery  Sexual health: discussed sexually transmitted diseases, partner selection, use of condoms, avoidance of unintended pregnancy, and contraceptive alternatives  · Exercise: the importance of regular exercise/physical activity was discussed  Recommend exercise 3-5 times per week for at least 30 minutes  Return for Annual GYN with PAP  Chief Complaint:     Chief Complaint   Patient presents with    Annual Exam     Needs physical done for school  Needs labs for titers      History of Present Illness:     Adult Annual Physical   Patient here for a comprehensive physical exam  The patient reports no problems  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  Diet and Physical Activity  · Diet/Nutrition: poor diet, frequent junk food and limited fruits/vegetables  Worse since starting nursing school  · Exercise: no formal exercise  Depression Screening  PHQ-2/9 Depression Screening    Little interest or pleasure in doing things: 1 - several days  Feeling down, depressed, or hopeless: 1 - several days  Trouble falling or staying asleep, or sleeping too much: 1 - several days  Feeling tired or having little energy: 2 - more than half the days  Poor appetite or overeating: 3 - nearly every day  Feeling bad about yourself - or that you are a failure or have let yourself or your family down: 0 - not at all  Trouble concentrating on things, such as reading the newspaper or watching television: 2 - more than half the days  Moving or speaking so slowly that other people could have noticed   Or the opposite - being so fidgety or restless that you have been moving around a lot more than usual: 0 - not at all  Thoughts that you would be better off dead, or of hurting yourself in some way: 0 - not at all  PHQ-9 Score: 10   PHQ-9 Interpretation: Moderate depression          General Health  · Sleep: sleeps poorly and gets 4-6 hours of sleep on average  · Hearing: normal - bilateral   · Vision: goes for regular eye exams and wears glasses  Hearing Screening    125Hz 250Hz 500Hz 1000Hz 2000Hz 3000Hz 4000Hz 6000Hz 8000Hz   Right ear:  20 20 20 20  20     Left ear:  20 20 20 20  20        Visual Acuity Screening    Right eye Left eye Both eyes   Without correction:      With correction: 20/30 20/30 20/30     · Dental: regular dental visits  /GYN Health  · Patient is: premenopausal  · Last menstrual period: 22  · Menses are regular, last 7 days, mild dysmenorrhea   · Contraceptive method: barrier methods   Health  · Symptoms include: No masses, erythema, lacerations, fluctation, ulcerations, none     Review of Systems:     Review of Systems   Constitutional: Negative for chills and fever  HENT: Negative for ear pain and sore throat  Eyes: Negative for pain and visual disturbance  Respiratory: Negative for cough, chest tightness and shortness of breath  Cardiovascular: Negative for chest pain and palpitations  Gastrointestinal: Negative for abdominal pain, constipation, diarrhea, nausea and vomiting  Genitourinary: Negative for dysuria, hematuria and menstrual problem  Musculoskeletal: Negative for arthralgias and back pain  Skin: Negative for color change and rash  Neurological: Negative for seizures and syncope  Psychiatric/Behavioral: Negative for dysphoric mood and suicidal ideas  All other systems reviewed and are negative  Past Medical History:     Past Medical History:   Diagnosis Date    Asthma     childhood    Depression     Obesity (BMI 30-39  9)       Past Surgical History:     Past Surgical History:   Procedure Laterality Date     SECTION        Social History:     E-Cigarette/Vaping    E-Cigarette Use Never User E-Cigarette/Vaping Substances    Nicotine No     THC No     CBD No     Flavoring No     Other No     Unknown No      Social History     Socioeconomic History    Marital status: Single     Spouse name: None    Number of children: None    Years of education: None    Highest education level: None   Occupational History    None   Tobacco Use    Smoking status: Never Smoker    Smokeless tobacco: Never Used   Vaping Use    Vaping Use: Never used   Substance and Sexual Activity    Alcohol use: No     Comment: rarely, every few months     Drug use: Never    Sexual activity: Yes     Partners: Male     Birth control/protection: Condom Male     Comment: History of unprotected sex  Oral sex  Other Topics Concern    None   Social History Narrative    None     Social Determinants of Health     Financial Resource Strain: Not on file   Food Insecurity: Not on file   Transportation Needs: Not on file   Physical Activity: Not on file   Stress: Not on file   Social Connections: Not on file   Intimate Partner Violence: Not on file   Housing Stability: Not on file      Family History:     Family History   Problem Relation Age of Onset    Asthma Mother     Other Mother     Diabetes Mother     Other Maternal Grandmother       Current Medications:     Current Outpatient Medications   Medication Sig Dispense Refill    FLUoxetine (PROzac) 20 mg capsule Take 20 mg by mouth daily at bedtime       No current facility-administered medications for this visit  Allergies:      Allergies   Allergen Reactions    Cefdinir Other (See Comments)     Unknown - childhood    Ceftin [Cefuroxime] Other (See Comments)     Unknown - childhood    Dust Mite Extract     Nystatin Other (See Comments)     Unknown - childhood    Pollen Extract       Physical Exam:     /80 (BP Location: Left arm, Patient Position: Sitting, Cuff Size: Large)   Pulse 96   Temp (!) 97 4 °F (36 3 °C) (Tympanic)   Resp 16   Ht 5' 1" (1 549 m)   Wt 83 9 kg (185 lb)   SpO2 98%   BMI 34 96 kg/m²     Physical Exam  Vitals and nursing note reviewed  Constitutional:       General: She is not in acute distress  Appearance: She is well-developed  HENT:      Head: Normocephalic and atraumatic  Right Ear: Tympanic membrane, ear canal and external ear normal  There is no impacted cerumen  Left Ear: Tympanic membrane, ear canal and external ear normal  There is no impacted cerumen  Nose: Nose normal  No rhinorrhea  Mouth/Throat:      Mouth: Mucous membranes are moist       Pharynx: Oropharynx is clear  No oropharyngeal exudate  Eyes:      Extraocular Movements: Extraocular movements intact  Conjunctiva/sclera: Conjunctivae normal       Pupils: Pupils are equal, round, and reactive to light  Cardiovascular:      Rate and Rhythm: Normal rate and regular rhythm  Heart sounds: No murmur heard  Pulmonary:      Effort: Pulmonary effort is normal  No respiratory distress  Breath sounds: Normal breath sounds  No wheezing  Abdominal:      General: Bowel sounds are normal  There is no distension  Palpations: Abdomen is soft  There is no mass  Tenderness: There is no abdominal tenderness  Hernia: No hernia is present  Musculoskeletal:         General: Normal range of motion  Cervical back: Normal range of motion and neck supple  Right lower leg: No edema  Left lower leg: No edema  Lymphadenopathy:      Cervical: No cervical adenopathy  Skin:     General: Skin is warm and dry  Capillary Refill: Capillary refill takes less than 2 seconds  Neurological:      General: No focal deficit present  Mental Status: She is alert and oriented to person, place, and time     Psychiatric:         Mood and Affect: Mood normal          Behavior: Behavior normal           Dorothy Rodriguez DO  [address] [place] [address]

## 2022-05-05 LAB
HBV SURFACE AB SER-ACNC: <3.1 MIU/ML
HCV AB S/CO SERPL IA: 0.1 S/CO RATIO (ref 0–0.9)
MEV IGG SER IA-ACNC: >300 AU/ML
MUV IGG SER IA-ACNC: <9 AU/ML
RUBV IGG SERPL IA-ACNC: 3.47 INDEX
VZV IGG SER IA-ACNC: 637 INDEX

## 2022-05-09 ENCOUNTER — TELEPHONE (OUTPATIENT)
Dept: FAMILY MEDICINE CLINIC | Facility: CLINIC | Age: 27
End: 2022-05-09

## 2022-05-09 NOTE — TELEPHONE ENCOUNTER
[facility]ax [facility]  Copy scanned into encounter  Placed in blue team folder at nurse station

## 2022-05-12 ENCOUNTER — CLINICAL SUPPORT (OUTPATIENT)
Dept: FAMILY MEDICINE CLINIC | Facility: CLINIC | Age: 27
End: 2022-05-12
Payer: COMMERCIAL

## 2022-05-12 ENCOUNTER — TELEPHONE (OUTPATIENT)
Dept: FAMILY MEDICINE CLINIC | Facility: CLINIC | Age: 27
End: 2022-05-12

## 2022-05-12 DIAGNOSIS — Z23 ENCOUNTER FOR IMMUNIZATION: Primary | ICD-10-CM

## 2022-05-12 PROCEDURE — 90746 HEPB VACCINE 3 DOSE ADULT IM: CPT

## 2022-05-12 PROCEDURE — 90707 MMR VACCINE SC: CPT

## 2022-05-12 PROCEDURE — 90471 IMMUNIZATION ADMIN: CPT

## 2022-05-12 PROCEDURE — 90472 IMMUNIZATION ADMIN EACH ADD: CPT

## 2022-05-12 NOTE — TELEPHONE ENCOUNTER
Pt was in the office today for her first injection of MMR and Hep B  She was not told about the schedule for getting these vaccines and also she has questions about the quantiferon gold test   It looks as though it wasn't done  Please let me know the dosing of the MMR and Hep B and the status of the quantiferon gold    Thank you

## 2022-05-12 NOTE — TELEPHONE ENCOUNTER
MMR is one time booster  Hep B is 3 dose series so at 0 months, 2 months, 6 months  Quantiferon was added on- there is a document under media that shows the test was added on- pls call [facility] to get the results  If they dont have the result or did not run the test, pls call pt and provide her the script for the quantiferon to be redone  I am away till Monday but can answer any other questions when I am back if not urgent- if urgent, pls have a resident in the clinic address  Thank you!

## 2022-05-16 ENCOUNTER — TELEPHONE (OUTPATIENT)
Dept: FAMILY MEDICINE CLINIC | Facility: CLINIC | Age: 27
End: 2022-05-16

## 2022-05-16 DIAGNOSIS — Z11.1 TUBERCULOSIS SCREENING: Primary | ICD-10-CM

## 2022-05-16 NOTE — TELEPHONE ENCOUNTER
I messaged patient on [employer] with your response    I will be calling [facility] today to see if the Quantiferon Gold was added

## 2022-05-16 NOTE — PROGRESS NOTES
According to [address] [place] [address] they were unable to add the quantiferon gold as per their reason the testing wasn't done as a quantiferon kit was needed. I generated a new lab slip for the quantiferon gold and contacted the patient through my chart.   Just an [facility]

## 2022-06-10 ENCOUNTER — APPOINTMENT (EMERGENCY)
Dept: RADIOLOGY | Facility: HOSPITAL | Age: 27
End: 2022-06-10
Payer: COMMERCIAL

## 2022-06-10 ENCOUNTER — HOSPITAL ENCOUNTER (EMERGENCY)
Facility: HOSPITAL | Age: 27
Discharge: HOME | End: 2022-06-11
Attending: EMERGENCY MEDICINE | Admitting: EMERGENCY MEDICINE
Payer: COMMERCIAL

## 2022-06-10 VITALS
TEMPERATURE: 98.7 F | DIASTOLIC BLOOD PRESSURE: 66 MMHG | WEIGHT: 190 LBS | HEIGHT: 61 IN | SYSTOLIC BLOOD PRESSURE: 120 MMHG | HEART RATE: 88 BPM | RESPIRATION RATE: 18 BRPM | BODY MASS INDEX: 35.87 KG/M2 | OXYGEN SATURATION: 97 %

## 2022-06-10 DIAGNOSIS — R10.9 ABDOMINAL PAIN: ICD-10-CM

## 2022-06-10 DIAGNOSIS — N39.0 UTI (URINARY TRACT INFECTION): Primary | ICD-10-CM

## 2022-06-10 LAB
ALBUMIN SERPL BCP-MCNC: 3.6 G/DL (ref 3.5–5)
ALP SERPL-CCNC: 82 U/L (ref 46–116)
ALT SERPL W P-5'-P-CCNC: 20 U/L (ref 12–78)
ANION GAP SERPL CALCULATED.3IONS-SCNC: 10 MMOL/L (ref 4–13)
AST SERPL W P-5'-P-CCNC: 16 U/L (ref 5–45)
BACTERIA UR QL AUTO: ABNORMAL /HPF
BASOPHILS # BLD AUTO: 0.04 THOUSANDS/ΜL (ref 0–0.1)
BASOPHILS NFR BLD AUTO: 0 % (ref 0–1)
BILIRUB SERPL-MCNC: 0.49 MG/DL (ref 0.2–1)
BILIRUB UR QL STRIP: NEGATIVE
BUN SERPL-MCNC: 9 MG/DL (ref 5–25)
CALCIUM SERPL-MCNC: 7.8 MG/DL (ref 8.3–10.1)
CHLORIDE SERPL-SCNC: 101 MMOL/L (ref 100–108)
CLARITY UR: ABNORMAL
CO2 SERPL-SCNC: 27 MMOL/L (ref 21–32)
COLOR UR: ABNORMAL
CREAT SERPL-MCNC: 0.76 MG/DL (ref 0.6–1.3)
EOSINOPHIL # BLD AUTO: 0.04 THOUSAND/ΜL (ref 0–0.61)
EOSINOPHIL NFR BLD AUTO: 0 % (ref 0–6)
ERYTHROCYTE [DISTWIDTH] IN BLOOD BY AUTOMATED COUNT: 15.6 % (ref 11.6–15.1)
EXT PREG TEST URINE: NEGATIVE
EXT. CONTROL ED NAV: NORMAL
GFR SERPL CREATININE-BSD FRML MDRD: 108 ML/MIN/1.73SQ M
GLUCOSE SERPL-MCNC: 99 MG/DL (ref 65–140)
GLUCOSE UR STRIP-MCNC: NEGATIVE MG/DL
HCT VFR BLD AUTO: 36 % (ref 34.8–46.1)
HGB BLD-MCNC: 11.4 G/DL (ref 11.5–15.4)
HGB UR QL STRIP.AUTO: NEGATIVE
IMM GRANULOCYTES # BLD AUTO: 0.03 THOUSAND/UL (ref 0–0.2)
IMM GRANULOCYTES NFR BLD AUTO: 0 % (ref 0–2)
KETONES UR STRIP-MCNC: ABNORMAL MG/DL
LEUKOCYTE ESTERASE UR QL STRIP: ABNORMAL
LIPASE SERPL-CCNC: 97 U/L (ref 73–393)
LYMPHOCYTES # BLD AUTO: 1.95 THOUSANDS/ΜL (ref 0.6–4.47)
LYMPHOCYTES NFR BLD AUTO: 18 % (ref 14–44)
MCH RBC QN AUTO: 25.7 PG (ref 26.8–34.3)
MCHC RBC AUTO-ENTMCNC: 31.7 G/DL (ref 31.4–37.4)
MCV RBC AUTO: 81 FL (ref 82–98)
MONOCYTES # BLD AUTO: 0.52 THOUSAND/ΜL (ref 0.17–1.22)
MONOCYTES NFR BLD AUTO: 5 % (ref 4–12)
MUCOUS THREADS UR QL AUTO: ABNORMAL
NEUTROPHILS # BLD AUTO: 8.36 THOUSANDS/ΜL (ref 1.85–7.62)
NEUTS SEG NFR BLD AUTO: 77 % (ref 43–75)
NITRITE UR QL STRIP: NEGATIVE
NON-SQ EPI CELLS URNS QL MICRO: ABNORMAL /HPF
NRBC BLD AUTO-RTO: 0 /100 WBCS
PH UR STRIP.AUTO: 6 [PH]
PLATELET # BLD AUTO: 238 THOUSANDS/UL (ref 149–390)
PMV BLD AUTO: 10.3 FL (ref 8.9–12.7)
POTASSIUM SERPL-SCNC: 3.7 MMOL/L (ref 3.5–5.3)
PROT SERPL-MCNC: 7.6 G/DL (ref 6.4–8.2)
PROT UR STRIP-MCNC: NEGATIVE MG/DL
RBC # BLD AUTO: 4.43 MILLION/UL (ref 3.81–5.12)
RBC #/AREA URNS AUTO: ABNORMAL /HPF
SODIUM SERPL-SCNC: 138 MMOL/L (ref 136–145)
SP GR UR STRIP.AUTO: 1.02 (ref 1–1.03)
UROBILINOGEN UR QL STRIP.AUTO: 0.2 E.U./DL
WBC # BLD AUTO: 10.94 THOUSAND/UL (ref 4.31–10.16)
WBC #/AREA URNS AUTO: ABNORMAL /HPF

## 2022-06-10 PROCEDURE — 81025 URINE PREGNANCY TEST: CPT | Performed by: EMERGENCY MEDICINE

## 2022-06-10 PROCEDURE — 80053 COMPREHEN METABOLIC PANEL: CPT | Performed by: EMERGENCY MEDICINE

## 2022-06-10 PROCEDURE — 99284 EMERGENCY DEPT VISIT MOD MDM: CPT

## 2022-06-10 PROCEDURE — 96374 THER/PROPH/DIAG INJ IV PUSH: CPT

## 2022-06-10 PROCEDURE — 85025 COMPLETE CBC W/AUTO DIFF WBC: CPT | Performed by: EMERGENCY MEDICINE

## 2022-06-10 PROCEDURE — 99284 EMERGENCY DEPT VISIT MOD MDM: CPT | Performed by: EMERGENCY MEDICINE

## 2022-06-10 PROCEDURE — 74176 CT ABD & PELVIS W/O CONTRAST: CPT

## 2022-06-10 PROCEDURE — 83690 ASSAY OF LIPASE: CPT | Performed by: EMERGENCY MEDICINE

## 2022-06-10 PROCEDURE — 81001 URINALYSIS AUTO W/SCOPE: CPT | Performed by: EMERGENCY MEDICINE

## 2022-06-10 PROCEDURE — 36415 COLL VENOUS BLD VENIPUNCTURE: CPT | Performed by: EMERGENCY MEDICINE

## 2022-06-10 PROCEDURE — G1004 CDSM NDSC: HCPCS

## 2022-06-10 RX ORDER — NITROFURANTOIN 25; 75 MG/1; MG/1
100 CAPSULE ORAL ONCE
Status: COMPLETED | OUTPATIENT
Start: 2022-06-11 | End: 2022-06-11

## 2022-06-10 RX ORDER — NITROFURANTOIN 25; 75 MG/1; MG/1
100 CAPSULE ORAL 2 TIMES DAILY
Qty: 10 CAPSULE | Refills: 0 | Status: SHIPPED | OUTPATIENT
Start: 2022-06-10

## 2022-06-10 RX ORDER — KETOROLAC TROMETHAMINE 30 MG/ML
15 INJECTION, SOLUTION INTRAMUSCULAR; INTRAVENOUS ONCE
Status: COMPLETED | OUTPATIENT
Start: 2022-06-10 | End: 2022-06-10

## 2022-06-10 RX ADMIN — KETOROLAC TROMETHAMINE 15 MG: 30 INJECTION, SOLUTION INTRAMUSCULAR at 22:02

## 2022-06-11 RX ADMIN — NITROFURANTOIN (MONOHYDRATE/MACROCRYSTALS) 100 MG: 75; 25 CAPSULE ORAL at 00:23

## 2022-06-11 NOTE — ED PROVIDER NOTES
History  Chief Complaint   Patient presents with    Abdominal Pain     Worsening abdominal pain since 1000     Pt in the ER with c/o 1 day of abd pain, now worsening  She hasn't taken any medication for pain relief  She denies n/v/f/c  She is well appearing at the time of my initial eval        History provided by:  Patient   used: No    Abdominal Pain  Pain location:  Periumbilical  Pain quality: aching    Pain radiates to:  Does not radiate  Pain severity:  Moderate  Onset quality:  Sudden  Timing:  Constant  Progression:  Worsening  Chronicity:  New  Relieved by:  Nothing  Worsened by:  Nothing  Ineffective treatments:  None tried  Associated symptoms: no chills, no cough, no diarrhea, no dysuria, no fever, no hematuria, no nausea, no shortness of breath and no vomiting        Prior to Admission Medications   Prescriptions Last Dose Informant Patient Reported? Taking? FLUoxetine (PROzac) 20 mg capsule Past Month at Unknown time  Yes Yes   Sig: Take 20 mg by mouth daily at bedtime      Facility-Administered Medications: None       Past Medical History:   Diagnosis Date    Asthma     childhood    Depression     Obesity (BMI 30-39  9)        Past Surgical History:   Procedure Laterality Date     SECTION         Family History   Problem Relation Age of Onset    Asthma Mother     Other Mother     Diabetes Mother     Other Maternal Grandmother      I have reviewed and agree with the history as documented      E-Cigarette/Vaping    E-Cigarette Use Never User      E-Cigarette/Vaping Substances    Nicotine No     THC No     CBD No     Flavoring No     Other No     Unknown No      Social History     Tobacco Use    Smoking status: Never Smoker    Smokeless tobacco: Never Used   Vaping Use    Vaping Use: Never used   Substance Use Topics    Alcohol use: No     Comment: rarely, every few months     Drug use: Never       Review of Systems   Constitutional: Negative for chills and fever  Respiratory: Negative for cough, chest tightness and shortness of breath  Gastrointestinal: Positive for abdominal pain  Negative for diarrhea, nausea and vomiting  Genitourinary: Negative for dysuria, frequency, hematuria and urgency  Musculoskeletal: Negative for back pain, neck pain and neck stiffness  All other systems reviewed and are negative  Physical Exam  Physical Exam  Vitals and nursing note reviewed  Constitutional:       General: She is not in acute distress  Appearance: She is well-developed  She is not diaphoretic  HENT:      Head: Normocephalic and atraumatic  Eyes:      Conjunctiva/sclera: Conjunctivae normal       Pupils: Pupils are equal, round, and reactive to light  Cardiovascular:      Rate and Rhythm: Normal rate and regular rhythm  Heart sounds: Normal heart sounds  No murmur heard  Pulmonary:      Effort: Pulmonary effort is normal  No respiratory distress  Breath sounds: Normal breath sounds  Abdominal:      General: Bowel sounds are normal  There is no distension  Palpations: Abdomen is soft  Tenderness: There is generalized abdominal tenderness  Hernia: No hernia is present  Musculoskeletal:         General: No deformity  Normal range of motion  Cervical back: Normal range of motion and neck supple  Skin:     General: Skin is warm and dry  Capillary Refill: Capillary refill takes less than 2 seconds  Coloration: Skin is not pale  Findings: No rash  Neurological:      General: No focal deficit present  Mental Status: She is alert and oriented to person, place, and time  Cranial Nerves: No cranial nerve deficit  Psychiatric:         Mood and Affect: Mood is anxious           Behavior: Behavior normal          Vital Signs  ED Triage Vitals [06/10/22 2034]   Temperature Pulse Respirations Blood Pressure SpO2   98 7 °F (37 1 °C) 89 18 111/56 98 %      Temp Source Heart Rate Source Patient Position - Orthostatic VS BP Location FiO2 (%)   Tympanic Monitor Sitting Right arm --      Pain Score       10 - Worst Possible Pain           Vitals:    06/10/22 2034 06/10/22 2045 06/10/22 2215   BP: 111/56 131/60 120/66   Pulse: 89 84 88   Patient Position - Orthostatic VS: Sitting  Lying         Visual Acuity      ED Medications  Medications   ketorolac (TORADOL) injection 15 mg (15 mg Intravenous Given 6/10/22 2202)   nitrofurantoin (MACROBID) extended-release capsule 100 mg (100 mg Oral Given 6/11/22 0023)       Diagnostic Studies  Results Reviewed     Procedure Component Value Units Date/Time    Comprehensive metabolic panel [[id number]]  (Abnormal) Collected: 06/10/22 2142    Lab Status: Final result Specimen: Blood from Arm, Left Updated: 06/10/22 2205     Sodium 138 mmol/L      Potassium 3 7 mmol/L      Chloride 101 mmol/L      CO2 27 mmol/L      ANION GAP 10 mmol/L      BUN 9 mg/dL      Creatinine 0 76 mg/dL      Glucose 99 mg/dL      Calcium 7 8 mg/dL      AST 16 U/L      ALT 20 U/L      Alkaline Phosphatase 82 U/L      Total Protein 7 6 g/dL      Albumin 3 6 g/dL      Total Bilirubin 0 49 mg/dL      eGFR 108 ml/min/1 73sq m     Narrative:      [place] guidelines for Chronic Kidney Disease (CKD):     Stage 1 with normal or high GFR (GFR > 90 mL/min/1 73 square meters)    Stage 2 Mild CKD (GFR = 60-89 mL/min/1 73 square meters)    Stage 3A Moderate CKD (GFR = 45-59 mL/min/1 73 square meters)    Stage 3B Moderate CKD (GFR = 30-44 mL/min/1 73 square meters)    Stage 4 Severe CKD (GFR = 15-29 mL/min/1 73 square meters)    Stage 5 End Stage CKD (GFR <15 mL/min/1 73 square meters)  Note: GFR calculation is accurate only with a steady state creatinine    Lipase [[id number]]  (Normal) Collected: 06/10/22 2142    Lab Status: Final result Specimen: Blood from Arm, Left Updated: 06/10/22 2205     Lipase 97 u/L     Urine Microscopic [[id number]]  (Abnormal) Collected: 06/10/22 2133    Lab Status: Final result Specimen: Urine, Clean Catch Updated: 06/10/22 2154     RBC, UA 1-2 /hpf      WBC, UA 4-10 /hpf      Epithelial Cells Occasional /hpf      Bacteria, UA Occasional /hpf      MUCUS THREADS Moderate    CBC and differential [[id number]]  (Abnormal) Collected: 06/10/22 2142    Lab Status: Final result Specimen: Blood from Arm, Left Updated: 06/10/22 2148     WBC 10 94 Thousand/uL      RBC 4 43 Million/uL      Hemoglobin 11 4 g/dL      Hematocrit 36 0 %      MCV 81 fL      MCH 25 7 pg      MCHC 31 7 g/dL      RDW 15 6 %      MPV 10 3 fL      Platelets 886 Thousands/uL      nRBC 0 /100 WBCs      Neutrophils Relative 77 %      Immat GRANS % 0 %      Lymphocytes Relative 18 %      Monocytes Relative 5 %      Eosinophils Relative 0 %      Basophils Relative 0 %      Neutrophils Absolute 8 36 Thousands/µL      Immature Grans Absolute 0 03 Thousand/uL      Lymphocytes Absolute 1 95 Thousands/µL      Monocytes Absolute 0 52 Thousand/µL      Eosinophils Absolute 0 04 Thousand/µL      Basophils Absolute 0 04 Thousands/µL     UA (URINE) with reflex to Scope [[id number]]  (Abnormal) Collected: 06/10/22 2133    Lab Status: Final result Specimen: Urine, Clean Catch Updated: 06/10/22 2139     Color, UA Light Yellow     Clarity, UA Slightly Cloudy     Specific Gravity, UA 1 025     pH, UA 6 0     Leukocytes, UA Trace     Nitrite, UA Negative     Protein, UA Negative mg/dl      Glucose, UA Negative mg/dl      Ketones, UA Trace mg/dl      Urobilinogen, UA 0 2 E U /dl      Bilirubin, UA Negative     Blood, UA Negative    POCT pregnancy, urine [[id number]]  (Normal) Resulted: 06/10/22 2132    Lab Status: Final result Updated: 06/10/22 2132     EXT PREG TEST UR (Ref: Negative) negative     Control valid                 CT abdomen pelvis wo contrast   Final Result by Malachi Patel MD (06/10 2337)      Mild circumferential wall thickening of the urinary bladder    This is likely accentuated by underdistention, however correlate with urinalysis to exclude cystitis  The study was marked in [facility] for immediate notification  Workstation performed: [id number]                    Procedures  Procedures         ED Course                                             MDM  Number of Diagnoses or Management Options  Abdominal pain: new and requires workup  UTI (urinary tract infection): new and requires workup     Amount and/or Complexity of Data Reviewed  Clinical lab tests: ordered and reviewed  Tests in the radiology section of CPT®: ordered and reviewed    Risk of Complications, Morbidity, and/or Mortality  Presenting problems: high  Diagnostic procedures: high  Management options: high    Patient Progress  Patient progress: improved      Disposition  Final diagnoses:   UTI (urinary tract infection)   Abdominal pain     Time reflects when diagnosis was documented in both MDM as applicable and the Disposition within this note     Time User Action Codes Description Comment    6/10/2022 11:56 PM Kaylee SALDIVAR Add [N39 0] UTI (urinary tract infection)     6/10/2022 11:56 PM Kaylee Muniz [R10 9] Abdominal pain       ED Disposition     ED Disposition   Discharge    Condition   Stable    Date/Time   Fri Murali 10, 2022 11:56 PM    Comment   Danay Robert discharge to home/self care                 Follow-up Information     Follow up With Specialties Details Why Abril Schedule an appointment as soon as possible for a visit in 3 days for follow up [address]  [phone]            Discharge Medication List as of 6/11/2022 12:00 AM      START taking these medications    Details   nitrofurantoin (MACROBID) 100 mg capsule Take 1 capsule (100 mg total) by mouth 2 (two) times a day, Starting Fri 6/10/2022, Normal         CONTINUE these medications which have NOT CHANGED    Details   FLUoxetine (PROzac) 20 mg capsule Take 20 mg by mouth daily at bedtime, Starting Fri 3/25/2022, Historical Med             No discharge procedures on file      PDMP Review     None          ED Provider  Electronically Signed by           Dago Young DO  06/11/22 8833

## 2022-07-12 ENCOUNTER — TELEPHONE (OUTPATIENT)
Dept: FAMILY MEDICINE CLINIC | Facility: CLINIC | Age: 27
End: 2022-07-12

## 2022-08-25 NOTE — DISCHARGE INSTRUCTIONS
Return to the ER for further concerns or worsening symptoms  Follow up with your primary care physician in 1-2 days  Take medication as prescribed [No Acute Distress] : no acute distress [Well-Appearing] : well-appearing [EOMI] : extraocular movements intact [Clear to Auscultation] : lungs were clear to auscultation bilaterally [Normal Rate] : normal rate  [Regular Rhythm] : with a regular rhythm [Normal S1, S2] : normal S1 and S2 [No Edema] : there was no peripheral edema [Soft] : abdomen soft [Non Tender] : non-tender [Normal Anterior Cervical Nodes] : no anterior cervical lymphadenopathy [No CVA Tenderness] : no CVA  tenderness [No Spinal Tenderness] : no spinal tenderness [Grossly Normal Strength/Tone] : grossly normal strength/tone [No Focal Deficits] : no focal deficits

## 2022-11-09 ENCOUNTER — TELEPHONE (OUTPATIENT)
Dept: FAMILY MEDICINE CLINIC | Facility: CLINIC | Age: 27
End: 2022-11-09

## 2022-11-09 NOTE — TELEPHONE ENCOUNTER
Patient requires a form to be completed  Patient is aware of 7-10 day turn around time  Please refer to the following information:       Type of Form: Physical Form     Date of Visit (if applicable):     Doctor: Berta Al     Expected date: How patient would like to receive form:      Fax number:     Patient phone number: [phone]      Copy scanned to encounter  Copy provided to patient  Original in [facility] team folder to be completed

## 2022-11-11 NOTE — TELEPHONE ENCOUNTER
Hello! Pls let pt know that she needs to get the Quantiferon gold blood test done to check for TB before this form can be completed and signed  Placed form back in my folder in preceptor room till lab is done  Thank you!

## 2022-12-20 LAB
GAMMA INTERFERON BACKGROUND BLD IA-ACNC: 0.01 IU/ML
M TB IFN-G CD4+ T-CELLS BLD-ACNC: 0.01 IU/ML
M TB IFN-G CD4+ T-CELLS BLD-ACNC: 0.02 IU/ML
MITOGEN IGNF BLD-ACNC: 6.5 IU/ML
QUANTIFERON INCUBATION COMMENT: NORMAL
QUANTIFERON-TB GOLD PLUS: NEGATIVE
SERVICE CMNT-IMP: NORMAL

## 2023-01-04 ENCOUNTER — CLINICAL SUPPORT (OUTPATIENT)
Dept: FAMILY MEDICINE CLINIC | Facility: CLINIC | Age: 28
End: 2023-01-04

## 2023-01-04 DIAGNOSIS — Z23 ENCOUNTER FOR IMMUNIZATION: Primary | ICD-10-CM

## 2023-01-16 ENCOUNTER — OFFICE VISIT (OUTPATIENT)
Dept: OBGYN CLINIC | Facility: CLINIC | Age: 28
End: 2023-01-16

## 2023-01-16 ENCOUNTER — OFFICE VISIT (OUTPATIENT)
Dept: FAMILY MEDICINE CLINIC | Facility: CLINIC | Age: 28
End: 2023-01-16

## 2023-01-16 VITALS
SYSTOLIC BLOOD PRESSURE: 139 MMHG | WEIGHT: 180 LBS | HEIGHT: 61 IN | DIASTOLIC BLOOD PRESSURE: 73 MMHG | HEART RATE: 70 BPM | BODY MASS INDEX: 33.99 KG/M2

## 2023-01-16 VITALS
HEIGHT: 61 IN | SYSTOLIC BLOOD PRESSURE: 139 MMHG | BODY MASS INDEX: 33.99 KG/M2 | WEIGHT: 180 LBS | RESPIRATION RATE: 18 BRPM | HEART RATE: 70 BPM | TEMPERATURE: 96.7 F | OXYGEN SATURATION: 98 % | DIASTOLIC BLOOD PRESSURE: 73 MMHG

## 2023-01-16 DIAGNOSIS — M22.2X1 PATELLOFEMORAL PAIN SYNDROME OF RIGHT KNEE: ICD-10-CM

## 2023-01-16 DIAGNOSIS — M25.561 ACUTE PAIN OF RIGHT KNEE: Primary | ICD-10-CM

## 2023-01-16 RX ORDER — ACETAMINOPHEN AND CODEINE PHOSPHATE 300; 30 MG/1; MG/1
1 TABLET ORAL
COMMUNITY
Start: 2022-10-28

## 2023-01-16 RX ORDER — AMOXICILLIN 500 MG/1
500 CAPSULE ORAL 3 TIMES DAILY
COMMUNITY
Start: 2022-10-28 | End: 2023-01-16 | Stop reason: ALTCHOICE

## 2023-01-16 NOTE — ASSESSMENT & PLAN NOTE
2 years ago fell and had an accident, could not bend/move knee for a week after  A year ago, had xray done which was unremarkable  States that she has pain walking up stairs and is not able to run  Began wearing brace 2 days ago which helps a bit  Denies numbness or tingling  On PE, pain in medial area when moved leg laterally as well as medial tenderness on palpation of the R knee       -Refer to ortho for further evaluation

## 2023-01-16 NOTE — LETTER
January 16, 2023     [address] [id number]    Patient: Mica Becker   YOB: 1995   Date of Visit: 1/16/2023       Dear Dr Ana M Leach:    Thank you for referring Hyacinth Rodriguez to me for evaluation  Below are my notes for this consultation  If you have questions, please do not hesitate to call me  I look forward to following your patient along with you  Sincerely,        Zacarias Leyva DO        CC: No Recipients  Zacarias Leyva DO  1/16/2023  3:15 PM  Sign when Signing Visit  Assessment/Plan:  1  Patellofemoral pain syndrome of right knee  Ambulatory Referral to Orthopedic Surgery    Ambulatory referral to Physical Therapy        Angi Powell has right-sided knee pain that is consistent with patellofemoral syndrome  Her knee pain seems to be located over the anterior aspect of the knee and is reproducible with patellar motion  I recommended formal physical therapy for her knee at this time  We also gave her a patellar stabilizing knee brace  If her pain persists or worsens despite PT we could consider further imaging  Follow-up after therapy if pain persist     Subjective:   Mica Becker is a 32 y o  female who presents to the office for evaluation for right-sided knee pain  She was referred by her PCP office  She states she had an injury about 2 years ago when she tripped landing directly on her right knee  She had pain and swelling in the knee and difficulty walking for about 3 to 4 weeks  She did not have any medical evaluation at that time  She then slipped on ice about 1 year later and injured the same knee  She states she has always had issues with this knee and pain that comes and goes  It worsens with more activity  Going down stairs will cause increased pain in the knee and she feels discomfort like the knee is getting give out on her  She denies any swelling or effusion  She denies any locking of the knee  Has tried no treatment at this point  Review of Systems   Constitutional: Negative for chills, fever and unexpected weight change  HENT: Negative for hearing loss, nosebleeds and sore throat  Eyes: Negative for pain, redness and visual disturbance  Respiratory: Negative for cough, shortness of breath and wheezing  Cardiovascular: Negative for chest pain, palpitations and leg swelling  Gastrointestinal: Negative for abdominal pain, nausea and vomiting  Endocrine: Negative for polydipsia and polyuria  Genitourinary: Negative for dysuria and hematuria  Musculoskeletal:        See HPI   Skin: Negative for rash and wound  Neurological: Negative for dizziness, numbness and headaches  Psychiatric/Behavioral: Negative for decreased concentration and suicidal ideas  The patient is not nervous/anxious  Past Medical History:   Diagnosis Date   • Asthma     childhood   • Depression    • Obesity (BMI 30-39  9)        Past Surgical History:   Procedure Laterality Date   •  SECTION         Family History   Problem Relation Age of Onset   • Asthma Mother    • Other Mother    • Diabetes Mother    • Other Maternal Grandmother        Social History     Occupational History   • Not on file   Tobacco Use   • Smoking status: Never   • Smokeless tobacco: Never   Vaping Use   • Vaping Use: Never used   Substance and Sexual Activity   • Alcohol use: No     Comment: rarely, every few months    • Drug use: Never   • Sexual activity: Yes     Partners: Male     Birth control/protection: Condom Male     Comment: History of unprotected sex  Oral sex            Current Outpatient Medications:   •  acetaminophen-codeine (TYLENOL #3) 300-30 mg per tablet, Take 1 tablet by mouth every 4 to 6 hours as needed for pain (Patient not taking: Reported on 2023), Disp: , Rfl:     Allergies   Allergen Reactions   • Cefdinir Other (See Comments)     Unknown - childhood   • Ceftin [Cefuroxime] Other (See Comments)     Unknown - childhood   • Dust Mite Extract    • Nystatin Other (See Comments)     Unknown - childhood   • Pollen Extract        Objective:  Vitals:    01/16/23 1430   BP: 139/73   Pulse: 70       Right Knee Exam     Tenderness   The patient is experiencing tenderness in the patella and medial joint line (Medial patellar facet)  Range of Motion   Extension: normal   Flexion: normal     Tests   Barbara:  Medial - negative Lateral - negative  Varus: negative Valgus: negative  Lachman:  Anterior - negative      Drawer:  Anterior - negative    Posterior - negative    Other   Erythema: absent  Sensation: normal  Pulse: present  Swelling: none  Effusion: no effusion present    Comments:  Positive patellar grind test          Observations     Right Knee   Negative for effusion  Physical Exam  Vitals and nursing note reviewed  Constitutional:       Appearance: She is well-developed  HENT:      Head: Normocephalic and atraumatic  Eyes:      General: No scleral icterus  Extraocular Movements: Extraocular movements intact  Conjunctiva/sclera: Conjunctivae normal    Cardiovascular:      Rate and Rhythm: Normal rate  Pulmonary:      Effort: Pulmonary effort is normal  No respiratory distress  Musculoskeletal:      Cervical back: Normal range of motion and neck supple  Right knee: No effusion  Instability Tests: Medial Barbara test negative and lateral Barbara test negative  Comments: As noted in HPI   Skin:     General: Skin is warm and dry  Neurological:      Mental Status: She is alert and oriented to person, place, and time  Psychiatric:         Behavior: Behavior normal          I have personally reviewed pertinent films in PACS and my interpretation is as follows:  X-rays of the right knee from 12/15/2022 demonstrate no evidence of acute fracture      This document was created using speech voice recognition software     Grammatical errors, random word insertions, pronoun errors, and incomplete sentences are an occasional consequence of this system due to software limitations, ambient noise, and hardware issues  Any formal questions or concerns about content, text, or information contained within the body of this dictation should be directly addressed to the provider for clarification

## 2023-01-16 NOTE — PROGRESS NOTES
Assessment/Plan:  1  Patellofemoral pain syndrome of right knee  Ambulatory Referral to Orthopedic Surgery    Ambulatory referral to Physical Therapy        Haider Sheth has right-sided knee pain that is consistent with patellofemoral syndrome  Her knee pain seems to be located over the anterior aspect of the knee and is reproducible with patellar motion  I recommended formal physical therapy for her knee at this time  We also gave her a patellar stabilizing knee brace  If her pain persists or worsens despite PT we could consider further imaging  Follow-up after therapy if pain persist     Subjective:   Lazaro Kaufman is a 32 y o  female who presents to the office for evaluation for right-sided knee pain  She was referred by her PCP office  She states she had an injury about 2 years ago when she tripped landing directly on her right knee  She had pain and swelling in the knee and difficulty walking for about 3 to 4 weeks  She did not have any medical evaluation at that time  She then slipped on ice about 1 year later and injured the same knee  She states she has always had issues with this knee and pain that comes and goes  It worsens with more activity  Going down stairs will cause increased pain in the knee and she feels discomfort like the knee is getting give out on her  She denies any swelling or effusion  She denies any locking of the knee  Has tried no treatment at this point  Review of Systems   Constitutional: Negative for chills, fever and unexpected weight change  HENT: Negative for hearing loss, nosebleeds and sore throat  Eyes: Negative for pain, redness and visual disturbance  Respiratory: Negative for cough, shortness of breath and wheezing  Cardiovascular: Negative for chest pain, palpitations and leg swelling  Gastrointestinal: Negative for abdominal pain, nausea and vomiting  Endocrine: Negative for polydipsia and polyuria     Genitourinary: Negative for dysuria and hematuria  Musculoskeletal:        See HPI   Skin: Negative for rash and wound  Neurological: Negative for dizziness, numbness and headaches  Psychiatric/Behavioral: Negative for decreased concentration and suicidal ideas  The patient is not nervous/anxious  Past Medical History:   Diagnosis Date   • Asthma     childhood   • Depression    • Obesity (BMI 30-39  9)        Past Surgical History:   Procedure Laterality Date   •  SECTION         Family History   Problem Relation Age of Onset   • Asthma Mother    • Other Mother    • Diabetes Mother    • Other Maternal Grandmother        Social History     Occupational History   • Not on file   Tobacco Use   • Smoking status: Never   • Smokeless tobacco: Never   Vaping Use   • Vaping Use: Never used   Substance and Sexual Activity   • Alcohol use: No     Comment: rarely, every few months    • Drug use: Never   • Sexual activity: Yes     Partners: Male     Birth control/protection: Condom Male     Comment: History of unprotected sex  Oral sex  Current Outpatient Medications:   •  acetaminophen-codeine (TYLENOL #3) 300-30 mg per tablet, Take 1 tablet by mouth every 4 to 6 hours as needed for pain (Patient not taking: Reported on 2023), Disp: , Rfl:     Allergies   Allergen Reactions   • Cefdinir Other (See Comments)     Unknown - childhood   • Ceftin [Cefuroxime] Other (See Comments)     Unknown - childhood   • Dust Mite Extract    • Nystatin Other (See Comments)     Unknown - childhood   • Pollen Extract        Objective:  Vitals:    23 1430   BP: 139/73   Pulse: 70       Right Knee Exam     Tenderness   The patient is experiencing tenderness in the patella and medial joint line (Medial patellar facet)      Range of Motion   Extension: normal   Flexion: normal     Tests   Barbara:  Medial - negative Lateral - negative  Varus: negative Valgus: negative  Lachman:  Anterior - negative      Drawer:  Anterior - negative    Posterior - negative    Other   Erythema: absent  Sensation: normal  Pulse: present  Swelling: none  Effusion: no effusion present    Comments:  Positive patellar grind test          Observations     Right Knee   Negative for effusion  Physical Exam  Vitals and nursing note reviewed  Constitutional:       Appearance: She is well-developed  HENT:      Head: Normocephalic and atraumatic  Eyes:      General: No scleral icterus  Extraocular Movements: Extraocular movements intact  Conjunctiva/sclera: Conjunctivae normal    Cardiovascular:      Rate and Rhythm: Normal rate  Pulmonary:      Effort: Pulmonary effort is normal  No respiratory distress  Musculoskeletal:      Cervical back: Normal range of motion and neck supple  Right knee: No effusion  Instability Tests: Medial Barbara test negative and lateral Barbara test negative  Comments: As noted in HPI   Skin:     General: Skin is warm and dry  Neurological:      Mental Status: She is alert and oriented to person, place, and time  Psychiatric:         Behavior: Behavior normal          I have personally reviewed pertinent films in PACS and my interpretation is as follows:  X-rays of the right knee from 12/15/2022 demonstrate no evidence of acute fracture      This document was created using speech voice recognition software  Grammatical errors, random word insertions, pronoun errors, and incomplete sentences are an occasional consequence of this system due to software limitations, ambient noise, and hardware issues  Any formal questions or concerns about content, text, or information contained within the body of this dictation should be directly addressed to the provider for clarification

## 2023-01-16 NOTE — PROGRESS NOTES
[facility] Office visit    Assessment/Plan:     1  Acute pain of right knee  Assessment & Plan:  2 years ago fell and had an accident, could not bend/move knee for a week after  A year ago, had xray done which was unremarkable  States that she has pain walking up stairs and is not able to run  Began wearing brace 2 days ago which helps a bit  Denies numbness or tingling  On PE, pain in medial area when moved leg laterally as well as medial tenderness on palpation of the R knee  -Refer to ortho for further evaluation    Orders:  -     Ambulatory Referral to Orthopedic Surgery; Future        No follow-ups on file  Subjective:   HPI  Dyan Perry is a 32 y o  female here for right knee pain  Denies any other acute complaints at this time  Review of Systems   Constitutional: Negative for chills and fever  HENT: Negative for ear pain and sore throat  Eyes: Negative for pain and visual disturbance  Respiratory: Negative for cough and shortness of breath  Cardiovascular: Negative for chest pain and palpitations  Gastrointestinal: Negative for abdominal pain and vomiting  Genitourinary: Negative for dysuria and hematuria  Musculoskeletal: Negative for arthralgias and back pain  Right knee pain   Skin: Negative for color change and rash  Neurological: Negative for seizures and syncope  All other systems reviewed and are negative  Objective:     /73 (BP Location: Left arm, Patient Position: Sitting, Cuff Size: Large)   Pulse 70   Temp (!) 96 7 °F (35 9 °C) (Tympanic Core)   Resp 18   Ht 5' 1" (1 549 m)   Wt 81 6 kg (180 lb)   LMP 12/30/2022   SpO2 98%   BMI 34 01 kg/m²      Physical Exam  Constitutional:       Appearance: Normal appearance  HENT:      Head: Atraumatic  Eyes:      General:         Right eye: No discharge  Left eye: No discharge        Conjunctiva/sclera: Conjunctivae normal    Cardiovascular:      Rate and Rhythm: Normal rate and regular rhythm  Pulmonary:      Effort: Pulmonary effort is normal  No respiratory distress  Breath sounds: Normal breath sounds  Abdominal:      General: Bowel sounds are normal       Palpations: Abdomen is soft  Tenderness: There is no abdominal tenderness  Musculoskeletal:         General: Tenderness (medial aspect of R knee with palpation) present  Cervical back: Neck supple  Comments: Pain on medial aspect of right knee with lateral movement    Skin:     General: Skin is warm and dry  Capillary Refill: Capillary refill takes less than 2 seconds  Neurological:      Mental Status: She is alert            ** Please Note: This note has been constructed using a voice recognition system Valentino Isaac, MD  01/16/23  11:48 AM

## 2023-01-26 ENCOUNTER — EVALUATION (OUTPATIENT)
Dept: PHYSICAL THERAPY | Facility: CLINIC | Age: 28
End: 2023-01-26

## 2023-01-26 DIAGNOSIS — G89.29 CHRONIC PAIN OF RIGHT KNEE: Primary | ICD-10-CM

## 2023-01-26 DIAGNOSIS — M22.2X1 PATELLOFEMORAL PAIN SYNDROME OF RIGHT KNEE: ICD-10-CM

## 2023-01-26 DIAGNOSIS — M25.561 CHRONIC PAIN OF RIGHT KNEE: Primary | ICD-10-CM

## 2023-01-26 NOTE — PROGRESS NOTES
PT Evaluation     Today's date: 2023  Patient name: Shyanne Gottlieb  : 1995  MRN: [id number]  Referring provider: Laurie Ferrera DO  Dx:   Encounter Diagnosis     ICD-10-CM    1  Chronic pain of right knee  M25 561     G89 29       2  Patellofemoral pain syndrome of right knee  M22 2X1 Ambulatory referral to Physical Therapy                     Assessment  Assessment details: Shyanne Gottlieb is a 32 y o  female who presents with pain, decreased strength, decreased ROM and ambulatory dysfunction  Due to these impairments, patient has difficulty performing ADL's, work-related activities, ambulation  Patient's clinical presentation is consistent with their referring diagnosis of Patellofemoral pain syndrome of right knee  Plan: Ambulatory referral to Physical Therapy    Patient has been educated in home exercise program and plan of care   Patient would benefit from skilled physical therapy services to address their aforementioned functional limitations and progress towards prior level of function and independence with home exercise program      Impairments: abnormal gait, abnormal or restricted ROM, activity intolerance, impaired physical strength, lacks appropriate home exercise program, pain with function, weight-bearing intolerance, poor posture  and poor body mechanics  Understanding of Dx/Px/POC: good   Prognosis: good    Goals  Short Term Goals to be accomplished in 3 weeks:  STG1: Pt will be I with HEP  STG2: Pt will demo 50% inc in knee AROM  STG3: Pt will demo 1/2 MMT strength in knee   STG4: Pt will amb community distance without gait deviates due to pain     Long Term Goals to be accomplished in 6 weeks:   LTG1: Pt will demo knee strength WNL to return to PLOF  LTG2: Pt will demo knee AROM WNL to minimize gait deviations  LTG3: Pt will return to household ADLs and work duties pain free as per [facility]      Plan  Plan details:  HEP development, stretching, strengthening, A/AA/PROM, joint mobilizations, posture education, STM/MI as needed to reduce muscle tension, muscle reeducation, PLOC discussed and agreed upon with patient  Patient would benefit from: PT eval and skilled physical therapy  Planned modality interventions: cryotherapy and thermotherapy: hydrocollator packs  Planned therapy interventions: manual therapy, neuromuscular re-education, self care, therapeutic activities, therapeutic exercise and home exercise program  Frequency: 2x week  Duration in weeks: 6  Treatment plan discussed with: patient        Subjective Evaluation    History of Present Illness  Mechanism of injury: Pt reports two years ago she was running with her dog and tripped and hurt her R knee  She has issues on and off after this  She finds major difficulty bending the knee after it has been straight  Pt reports lately her knee is throbbing often  She has pain while walking her dog on a daily basis  She had an episode in which her knee felt like it was going to give out  "I can't walk fast" as she used to  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, this is hard to do with her current status  Her doctor gave her a knee brace and she feels this has been helpful  Pt denies sleep disturbance  Pt not taking medication or using modalities for pain       Relieving factors: not certain, brace, less activity  Aggravating factors: not certain  Quality of life: good    Pain  Current pain ratin  At best pain ratin  At worst pain ratin          Objective     Active Range of Motion   Left Knee   Flexion: 126 degrees   Extension: 0 degrees     Right Knee   Flexion: 124 degrees with pain  Extensor lag: 3 degrees     Additional Active Range of Motion Details  Pain with change in directions    Strength/Myotome Testing     Left Knee   Normal strength  Quadriceps contraction: good    Right Knee   Flexion: 4  Extension: 4+  Quadriceps contraction: fair    Tests     Additional Tests Details  (-) ligamentous testing    General Comments:      Knee Comments  Slow gait, slow xfers              Eval/ Re-eval Auth #/ Referral # Total visits Start date  Expiration date Total active units  Total manual units  PT only or  PT+OT?    req                                                         Date:           Total authorized units:  Active:            Manual:           Total remaining units:  Active:               Manual:                Date:           Total authorized units: Active:            Manual:           Total remaining units:  Active:               Manual:                    Precautions: Standard      Visit 1             1/26/23                                                   Neuro Re-Ed                                                                                                        Ther Ex             QS 10            SAQ 10            Gastroc str 5x10s                                                                             Ther Activity                                       Gait Training                                       Modalities

## 2023-02-02 ENCOUNTER — APPOINTMENT (OUTPATIENT)
Dept: PHYSICAL THERAPY | Facility: CLINIC | Age: 28
End: 2023-02-02

## 2023-02-09 ENCOUNTER — OFFICE VISIT (OUTPATIENT)
Dept: PHYSICAL THERAPY | Facility: CLINIC | Age: 28
End: 2023-02-09

## 2023-02-09 DIAGNOSIS — M22.2X1 PATELLOFEMORAL PAIN SYNDROME OF RIGHT KNEE: ICD-10-CM

## 2023-02-09 DIAGNOSIS — M25.561 CHRONIC PAIN OF RIGHT KNEE: Primary | ICD-10-CM

## 2023-02-09 DIAGNOSIS — G89.29 CHRONIC PAIN OF RIGHT KNEE: Primary | ICD-10-CM

## 2023-02-09 NOTE — PROGRESS NOTES
Daily Note     Today's date: 2023  Patient name: Shyanne Gottlieb  : 1995  MRN: [id number]  Referring provider: Laurie Ferrera DO  Dx:   Encounter Diagnosis     ICD-10-CM    1  Chronic pain of right knee  M25 561     G89 29       2  Patellofemoral pain syndrome of right knee  M22 2X1                      Subjective: Pt reports her knee has been really sore, she's been doing regular school and work duties as well as home requirements  Pt's pain is 0/10 at rest  Prolonged sitting is painful in the knee when it's bent but if it's kept straight for an extended time its difficult to bend  Objective: See treatment diary below  R knee AROM baseline 0-120 with slight pain flexion end range  Gained 5 degrees flexion following repeated flexion with roll behind knee to increase joint space during motion  Inc time spent today reassessing joint and response to repeated motions, primarily flexion in an effort to inc ROM and maximize joint space  Assessment: Tolerated treatment fair  Patient demo good mechanical response today however symptomatic response does not represent that of mechanical gains  Pt has resting symptoms in standing following repeated flexion without joint space  With the lack of anterior tibial translation she does not demo as successful improvement  Plan: Continue per plan of care        Eval/ Re-eval Auth #/ Referral # Total visits Start date  Expiration date Total active units  Total manual units  PT only or  PT+OT?   23  [id number] 12 23 n/a n/a                                                         Precautions: Standard      Visit 1 2       23              Rep flexion R knee  With towel roll behind knee/tin ant               Neuro Re-Ed                                Ther Ex        QS 10 10x      SAQ 10       Gastroc str 5x10s 5x10s      Prone quad str  SOS 5s x10      Self knee flex with towel roll  10x                              Ther Activity                        Gait Training                        Modalities

## 2023-02-16 ENCOUNTER — OFFICE VISIT (OUTPATIENT)
Dept: PHYSICAL THERAPY | Facility: CLINIC | Age: 28
End: 2023-02-16

## 2023-02-16 DIAGNOSIS — G89.29 CHRONIC PAIN OF RIGHT KNEE: Primary | ICD-10-CM

## 2023-02-16 DIAGNOSIS — M22.2X1 PATELLOFEMORAL PAIN SYNDROME OF RIGHT KNEE: ICD-10-CM

## 2023-02-16 DIAGNOSIS — M25.561 CHRONIC PAIN OF RIGHT KNEE: Primary | ICD-10-CM

## 2023-02-16 NOTE — PROGRESS NOTES
Daily Note     Today's date: 2023  Patient name: Mariaelena Madrid  : 1995  MRN: [id number]  Referring provider: Martha Rivas DO  Dx:   Encounter Diagnosis     ICD-10-CM    1  Chronic pain of right knee  M25 561     G89 29       2  Patellofemoral pain syndrome of right knee  M22 2X1                      Subjective: Pt is pleased with her overall progress but does still have pain while she going up the stairs  Objective: See treatment diary below  R knee flexion AROM 125 deg  Assessment: Tolerated treatment well  Patient demo pain with loaded activities in midrange, does improve with repeated motions however < 50% improvement  Overall pain is greatly reduced  General mechanical improvements from last session have maintained to this session, good prognosis associated with this  Plan: Continue per plan of care        Eval/ Re-eval Auth #/ Referral # Total visits Start date  Expiration date Total active units  Total manual units  PT only or  PT+OT?   23  [id number] 12 23 n/a n/a                                                         Precautions: Standard      Visit 1 2 3      23             Rep flexion R knee  With towel roll behind knee/tin ant  With and without, lessening fulcrum t/o session              Neuro Re-Ed                                Ther Ex        QS 10 10x      SAQ 10       Gastroc str 5x10s 5x10s      Prone quad str  SOS 5s x10 5x10s     Self knee flex with towel roll  10x Rev     Leg press   75lbs 3x10     TRX squats   Attempted painful     Step ups    6'' fwd 10x, lat 10x     HTs   Foam Attempted painful     Step backs   6" Attempted painful             Ther Activity                        Gait Training                        Modalities

## 2023-02-21 ENCOUNTER — OFFICE VISIT (OUTPATIENT)
Dept: PHYSICAL THERAPY | Facility: CLINIC | Age: 28
End: 2023-02-21

## 2023-02-21 DIAGNOSIS — M25.561 CHRONIC PAIN OF RIGHT KNEE: Primary | ICD-10-CM

## 2023-02-21 DIAGNOSIS — M22.2X1 PATELLOFEMORAL PAIN SYNDROME OF RIGHT KNEE: ICD-10-CM

## 2023-02-21 DIAGNOSIS — G89.29 CHRONIC PAIN OF RIGHT KNEE: Primary | ICD-10-CM

## 2023-02-21 NOTE — PROGRESS NOTES
Daily Note     Today's date: 2023  Patient name: Justice Latif  : 1995  MRN: [id number]  Referring provider: Daisy Suero DO  Dx:   Encounter Diagnosis     ICD-10-CM    1  Chronic pain of right knee  M25 561     G89 29       2  Patellofemoral pain syndrome of right knee  M22 2X1                      Subjective: Pt reports her knee is really improving, she doesn't really having pain anymore aside from negotiating stairs, more so ascending than descending  She does admit her stair negotiation is less careful than it had been  Objective: See treatment diary below  L knee AROM 128 deg flexion pain free  Assessment: Tolerated treatment well  Patient demo good ROM progression and overall good pain reduction as she progresses towards goals  Pt demo good HEP comprehension  Plan: Continue per plan of care   F/u in 2 weeks     Eval/ Re-eval Auth #/ Referral # Total visits Start date  Expiration date Total active units  Total manual units  PT only or  PT+OT?   23  [id number] 12 23 n/a n/a                                                         Precautions: Standard      Visit 1 2 3 4     23            Rep flexion R knee  With towel roll behind knee/tin ant  With and without, lessening fulcrum t/o session  With and without, lessening fulcrum t/o sessio            Neuro Re-Ed                                Ther Ex        QS 10 10x      SAQ 10       Gastroc str 5x10s 5x10s      Prone quad str  SOS 5s x10 5x10s Manual    Self knee flex with towel roll  10x Rev Rev    Leg press   75lbs 3x10     TRX squats   Attempted painful     Step ups    6'' fwd 10x, lat 10x 25x3 10"    HTs   Foam Attempted painful     Step backs   6" Attempted painful 6" rev            Ther Activity                        Gait Training                        Modalities

## 2023-03-06 ENCOUNTER — CLINICAL SUPPORT (OUTPATIENT)
Dept: FAMILY MEDICINE CLINIC | Facility: CLINIC | Age: 28
End: 2023-03-06

## 2023-03-06 ENCOUNTER — TELEPHONE (OUTPATIENT)
Dept: FAMILY MEDICINE CLINIC | Facility: CLINIC | Age: 28
End: 2023-03-06

## 2023-03-06 DIAGNOSIS — N91.2 AMENORRHEA: Primary | ICD-10-CM

## 2023-03-06 LAB — SL AMB POCT URINE HCG: NORMAL

## 2023-03-06 NOTE — TELEPHONE ENCOUNTER
Called pt to scheduled initial OB  Reached vm  Left msg  Also need to confirm to cancel 3/14 appt to discuss birth control as pt is now pregnant

## 2023-03-09 ENCOUNTER — TELEPHONE (OUTPATIENT)
Dept: FAMILY MEDICINE CLINIC | Facility: CLINIC | Age: 28
End: 2023-03-09

## 2023-03-09 ENCOUNTER — OFFICE VISIT (OUTPATIENT)
Dept: PHYSICAL THERAPY | Facility: CLINIC | Age: 28
End: 2023-03-09

## 2023-03-09 DIAGNOSIS — M25.561 CHRONIC PAIN OF RIGHT KNEE: Primary | ICD-10-CM

## 2023-03-09 DIAGNOSIS — G89.29 CHRONIC PAIN OF RIGHT KNEE: Primary | ICD-10-CM

## 2023-03-09 DIAGNOSIS — M22.2X1 PATELLOFEMORAL PAIN SYNDROME OF RIGHT KNEE: ICD-10-CM

## 2023-03-09 NOTE — PROGRESS NOTES
Daily Note - Discharge Summary    Today's date: 3/9/2023  Patient name: Florencio Brown  : 1995  MRN: [id number]  Referring provider: Wm Sheridan DO  Dx:   Encounter Diagnosis     ICD-10-CM    1  Chronic pain of right knee  M25 561     G89 29       2  Patellofemoral pain syndrome of right knee  M22 2X1                      Subjective: Pt reports she cannot remember the last time her knee has hurt, no longer having issues walking her dog  Feels she is in control of this and able to perform HEP  Objective: See treatment diary below  R knee AROM 0-125 deg pain free  R knee strength WNL, pain free  LTGs accomplished  Assessment: Tolerated treatment well  Patient has participated in skilled PT since time of IE and at this time has returned to her PLOF successfully and pain free  She is accomplishing all ADLs and activities in community without issue  She is overall very competent in HEP management and demo good potential for further management and reduction of exacerbation  Pt has been an absolute pleasure to work with         Plan: DC to I HEP     Eval/ Re-eval Auth #/ Referral # Total visits Start date  Expiration date Total active units  Total manual units  PT only or  PT+OT?   23  [id number] 12 23 n/a n/a                                                         Precautions: Standard      Visit 1 2 3 4 5    1/26/23 2/9/23 2/16/23 2/21/23 3/9/23           Rep flexion R knee  With towel roll behind knee/tin ant  With and without, lessening fulcrum t/o session  With and without, lessening fulcrum t/o sessio            Neuro Re-Ed                                Ther Ex        QS 10 10x   Rev   SAQ 10    Rev   Gastroc str 5x10s 5x10s   Rev   Prone quad str  SOS 5s x10 5x10s Manual Rev   Self knee flex with towel roll  10x Rev Rev Rev   Leg press   75lbs 3x10     TRX squats   Attempted painful     Step ups    6'' fwd 10x, lat 10x 25x3 10" 25x3   HTs   Foam Attempted painful  Rev   Step backs   6" Attempted painful 6" rev Rev           Ther Activity                        Gait Training                        Modalities

## 2023-03-23 ENCOUNTER — TELEPHONE (OUTPATIENT)
Dept: ADMINISTRATIVE | Facility: HOSPITAL | Age: 28
End: 2023-03-23

## 2023-03-23 NOTE — TELEPHONE ENCOUNTER
Dr Sejal Logan,    I called Toya Weathers to schedule us OB US that as ordered yesterday  Pt stated that she is no longer pregnant  Please advise  Thank you

## 2023-03-29 NOTE — TELEPHONE ENCOUNTER
Called pt to clarify, no answer, VM unidentified so did not leave VM due to sensitive nature of topic

## 2023-04-27 PROBLEM — M25.561 ACUTE PAIN OF RIGHT KNEE: Status: RESOLVED | Noted: 2023-01-16 | Resolved: 2023-04-27

## 2023-05-01 ENCOUNTER — OFFICE VISIT (OUTPATIENT)
Dept: URGENT CARE | Facility: CLINIC | Age: 28
End: 2023-05-01

## 2023-05-01 VITALS
RESPIRATION RATE: 16 BRPM | HEART RATE: 76 BPM | OXYGEN SATURATION: 100 % | BODY MASS INDEX: 31.93 KG/M2 | TEMPERATURE: 98.1 F | WEIGHT: 169 LBS

## 2023-05-01 DIAGNOSIS — N39.0 URINARY TRACT INFECTION WITHOUT HEMATURIA, SITE UNSPECIFIED: Primary | ICD-10-CM

## 2023-05-01 LAB
SL AMB  POCT GLUCOSE, UA: ABNORMAL
SL AMB LEUKOCYTE ESTERASE,UA: ABNORMAL
SL AMB POCT BILIRUBIN,UA: ABNORMAL
SL AMB POCT BLOOD,UA: ABNORMAL
SL AMB POCT CLARITY,UA: ABNORMAL
SL AMB POCT COLOR,UA: ABNORMAL
SL AMB POCT KETONES,UA: ABNORMAL
SL AMB POCT NITRITE,UA: ABNORMAL
SL AMB POCT PH,UA: 7
SL AMB POCT SPECIFIC GRAVITY,UA: 1.02
SL AMB POCT URINE HCG: NEGATIVE
SL AMB POCT URINE PROTEIN: 30
SL AMB POCT UROBILINOGEN: 8

## 2023-05-01 RX ORDER — NITROFURANTOIN 25; 75 MG/1; MG/1
100 CAPSULE ORAL 2 TIMES DAILY
Qty: 10 CAPSULE | Refills: 0 | Status: SHIPPED | OUTPATIENT
Start: 2023-05-01 | End: 2023-05-06

## 2023-05-01 NOTE — PROGRESS NOTES
[facility]        NAME: Gray Hill is a 32 y o  female  : 1995    MRN: [id number]  DATE: May 1, 2023  TIME: 7:10 PM    Assessment and Plan   Urinary tract infection without hematuria, site unspecified [N39 0]  1  Urinary tract infection without hematuria, site unspecified  POCT urine dip    POCT urine HCG    Urine culture    nitrofurantoin (MACROBID) 100 mg capsule        Push fluids  UA suggestive of UTI  Discussed strict return to care precautions as well as red flag symptoms which should prompt immediate ED referral  Pt verbalized understanding and is in agreement with plan  Please follow up with your primary care provider within the next week  Please remember that your visit today was with an urgent care provider and should not replace follow up with your primary care provider for chronic medical issues or annual physicals  Patient Instructions       Follow up with PCP in 3-5 days  Proceed to  ER if symptoms worsen  Chief Complaint     Chief Complaint   Patient presents with    Possible UTI     Pt presents with urinary burning sensation, back pain, urgency; started 3 days ago         History of Present Illness       Pt is a(n) 32 y o  female  with no reported pmh who presents out of concern for a UTI  Dysuria: Yes  Urgency: Yes  Frequency: Yes  Hematuria: No  Cloudy/malodorous urine: Yes  Vaginal discharge/itching: No  Concerns for STD: No  Possibility of pregnancy: LMP 3 days ago but just recently started birth control, currently spotting  Fever: No  Flank pain: Yes b/l  Nausea/vomiting: No  Previous UTIs: Yes  Last UTI: last year        Review of Systems   Review of Systems   Constitutional: Negative for chills, diaphoresis and fever  Respiratory: Negative for shortness of breath  Cardiovascular: Negative for chest pain  Gastrointestinal: Negative for abdominal pain, nausea and vomiting  Genitourinary: Positive for dysuria, frequency and urgency   Negative for hematuria  Musculoskeletal: Positive for back pain  Negative for myalgias  Psychiatric/Behavioral: Negative for confusion  Current Medications       Current Outpatient Medications:     nitrofurantoin (MACROBID) 100 mg capsule, Take 1 capsule (100 mg total) by mouth 2 (two) times a day for 5 days, Disp: 10 capsule, Rfl: 0    Current Allergies     Allergies as of 2023 - Reviewed 2023   Allergen Reaction Noted    Cefdinir Other (See Comments) 05/15/2014    Ceftin [cefuroxime] Other (See Comments) 2016    Dust mite extract  2014    Nystatin Other (See Comments) 2016    Pollen extract  2014            The following portions of the patient's history were reviewed and updated as appropriate: allergies, current medications, past family history, past medical history, past social history, past surgical history and problem list      Past Medical History:   Diagnosis Date    Acute pain of right knee 2023    Asthma     childhood    Depression     Obesity (BMI 30-39  9)        Past Surgical History:   Procedure Laterality Date     SECTION      MOUTH SURGERY N/A        Family History   Problem Relation Age of Onset    Asthma Mother     Other Mother     Diabetes Mother     Other Maternal Grandmother          Medications have been verified  Objective   Pulse 76   Temp 98 1 °F (36 7 °C)   Resp 16   Wt 76 7 kg (169 lb)   LMP  (LMP Unknown)   SpO2 100%   BMI 31 93 kg/m²        Physical Exam     Physical Exam  Vitals and nursing note reviewed  Constitutional:       General: She is not in acute distress  Appearance: Normal appearance  She is not ill-appearing  HENT:      Head: Normocephalic and atraumatic  Cardiovascular:      Rate and Rhythm: Normal rate and regular rhythm  Heart sounds: Normal heart sounds  Pulmonary:      Effort: Pulmonary effort is normal  No respiratory distress  Breath sounds: Normal breath sounds  No wheezing, rhonchi or rales  Abdominal:      General: Abdomen is flat  Palpations: Abdomen is soft  Tenderness: There is no abdominal tenderness  There is no right CVA tenderness, left CVA tenderness or guarding  Skin:     General: Skin is warm and dry  Capillary Refill: Capillary refill takes less than 2 seconds  Neurological:      Mental Status: She is alert and oriented to person, place, and time     Psychiatric:         Behavior: Behavior normal

## 2023-05-03 LAB
BACTERIA UR CULT: NORMAL
Lab: NO GROWTH

## 2023-05-16 ENCOUNTER — HOSPITAL ENCOUNTER (EMERGENCY)
Facility: HOSPITAL | Age: 28
Discharge: HOME | End: 2023-05-16
Attending: EMERGENCY MEDICINE

## 2023-05-16 VITALS
OXYGEN SATURATION: 99 % | WEIGHT: 164 LBS | TEMPERATURE: 98.2 F | HEART RATE: 70 BPM | RESPIRATION RATE: 16 BRPM | DIASTOLIC BLOOD PRESSURE: 74 MMHG | SYSTOLIC BLOOD PRESSURE: 131 MMHG | BODY MASS INDEX: 30.99 KG/M2

## 2023-05-16 DIAGNOSIS — B37.31 VAGINAL YEAST INFECTION: Primary | ICD-10-CM

## 2023-05-16 RX ORDER — FLUCONAZOLE 150 MG/1
150 TABLET ORAL ONCE
Qty: 1 TABLET | Refills: 0 | Status: SHIPPED | OUTPATIENT
Start: 2023-05-16 | End: 2023-05-16

## 2023-05-16 RX ORDER — FLUCONAZOLE 150 MG/1
150 TABLET ORAL ONCE
Status: DISCONTINUED | OUTPATIENT
Start: 2023-05-16 | End: 2023-05-16

## 2023-05-16 NOTE — ED ATTENDING ATTESTATION
5/16/2023  I, Danis Mahoney MD, saw and evaluated the patient  I have discussed the patient with the resident/non-physician practitioner and agree with the resident's/non-physician practitioner's findings, Plan of Care, and MDM as documented in the resident's/non-physician practitioner's note, except where noted  All available labs and Radiology studies were reviewed  I was present for key portions of any procedure(s) performed by the resident/non-physician practitioner and I was immediately available to provide assistance  At this point I agree with the current assessment done in the Emergency Department  I have conducted an independent evaluation of this patient a history and physical is as follows:  Patient is a 80-year-old female  Recent antibiotic treatment for UTI  Complaining of vaginal pain and itching  Clear discharge  No abdominal pain  No fever  Physical exam: Benign abdominal exam   Assessment/plan: Pelvic exam done by resident suggest candidiasis  Will treat  Follow-up with GYN    ED Course         Critical Care Time  Procedures

## 2023-05-16 NOTE — ED PROVIDER NOTES
"History  Chief Complaint   Patient presents with   • Vaginal Pain     PT presents with birth control in upper left arm placed two weeks ago and c/o \"increased vaginal pain with liquidy discharge\" PT was treated for UTI two weeks ago     27yo F presenting for vaginal pain  2 months ago Pt had an implantable contraceptive placed without complication  2 weeks ago she developed a UTI and was placed on a 5 day course of Abx  1 week later she developed external vaginal pain, itchiness, redness, non-odorous white discharge, and swelling  Her urinary symptoms have resolved  She did not find relief in her sx with tylenol and ibuprofen  Denies F/C, abdominal pain, vaginal bleeding, history of STIs, N/V, vaginal trauma, abuse  History provided by:  Patient      None       Past Medical History:   Diagnosis Date   • Acute pain of right knee 2023   • Asthma     childhood   • Depression    • Obesity (BMI 30-39  9)        Past Surgical History:   Procedure Laterality Date   •  SECTION     • MOUTH SURGERY N/A        Family History   Problem Relation Age of Onset   • Asthma Mother    • Other Mother    • Diabetes Mother    • Other Maternal Grandmother      I have reviewed and agree with the history as documented  E-Cigarette/Vaping   • E-Cigarette Use Never User      E-Cigarette/Vaping Substances   • Nicotine No    • THC No    • CBD No    • Flavoring No    • Other No    • Unknown No      Social History     Tobacco Use   • Smoking status: Never     Passive exposure: Past   • Smokeless tobacco: Never   Vaping Use   • Vaping Use: Never used   Substance Use Topics   • Alcohol use: Yes     Comment: rarely, every few months    • Drug use: Never        Review of Systems   Constitutional: Negative  HENT: Negative  Respiratory: Negative  Cardiovascular: Negative  Gastrointestinal: Negative  Genitourinary: Positive for vaginal discharge and vaginal pain   Negative for dyspareunia, dysuria, flank pain, " hematuria, pelvic pain and urgency  Musculoskeletal: Negative  Skin: Negative  Neurological: Negative  Psychiatric/Behavioral: Positive for dysphoric mood  Physical Exam  ED Triage Vitals [05/16/23 1401]   Temperature Pulse Respirations Blood Pressure SpO2   98 2 °F (36 8 °C) 70 16 131/74 99 %      Temp Source Heart Rate Source Patient Position - Orthostatic VS BP Location FiO2 (%)   Oral Monitor Lying Right arm --      Pain Score       8             Orthostatic Vital Signs  Vitals:    05/16/23 1401   BP: 131/74   Pulse: 70   Patient Position - Orthostatic VS: Lying       Physical Exam  Exam conducted with a chaperone present  Constitutional:       Appearance: Normal appearance  Comments: Tearful when giving history   HENT:      Head: Normocephalic and atraumatic  Right Ear: External ear normal       Left Ear: External ear normal       Nose: Nose normal  No rhinorrhea  Mouth/Throat:      Mouth: Mucous membranes are moist       Pharynx: Oropharynx is clear  Eyes:      Extraocular Movements: Extraocular movements intact  Conjunctiva/sclera: Conjunctivae normal    Cardiovascular:      Rate and Rhythm: Normal rate and regular rhythm  Pulses: Normal pulses  Heart sounds: Normal heart sounds  Pulmonary:      Effort: Pulmonary effort is normal       Breath sounds: Normal breath sounds  Abdominal:      General: Abdomen is flat  Palpations: Abdomen is soft  Genitourinary:     Labia:         Right: Rash and tenderness present  No lesion or injury  Left: Rash and tenderness present  No lesion or injury  Vagina: Normal       Cervix: Normal    Musculoskeletal:      Right lower leg: No edema  Left lower leg: No edema  Skin:     General: Skin is warm and dry  Capillary Refill: Capillary refill takes less than 2 seconds  Neurological:      General: No focal deficit present        Mental Status: She is alert and oriented to person, place, and time          ED Medications  Medications - No data to display    Diagnostic Studies  Results Reviewed     None                 No orders to display         Procedures  Procedures      ED Course  ED Course as of 05/16/23 1726   Tue May 16, 2023   1408 Ddx includes but not limited to UTI, STI, PID, yeast infxn , menorrhagia, ectopic pregnancy, spontaneous AB  12 Pt refused any treatment or workup d/t insurance concerns  States she'd rather go to [address] [place] [address] for further workup  I explained the risks of not being treated/worked up and they verbalized understanding  Medical Decision Making  Risk  Prescription drug management  Disposition  Final diagnoses:   Vaginal yeast infection     Time reflects when diagnosis was documented in both MDM as applicable and the Disposition within this note     Time User Action Codes Description Comment    5/16/2023  2:36 PM Patrica Lemus Add [B37 31] Vaginal yeast infection       ED Disposition     ED Disposition   Discharge    Condition   Stable    Date/Time   Tue May 16, 2023  2:41 PM    Comment   Ebony Muro discharge to home/self care                 Follow-up Information     Follow up With Specialties Details Why Contact Info Additional [address] [facility] Emergency Department Emergency Medicine Go to  If symptoms worsen [address] Emergency Department, [address], [place], [address] [facility] Obstetrics and Gynecology Schedule an appointment as soon as possible for a visit  As needed [address]  [id number]  [phone] [place] [address] [facility], [address] [place] [address], [id number] [phone]          Discharge Medication List as of 5/16/2023  2:41 PM      START taking these medications    Details fluconazole (DIFLUCAN) 150 mg tablet Take 1 tablet (150 mg total) by mouth once for 1 dose, Starting Tue 5/16/2023, Print               PDMP Review     None           ED Provider  Attending physically available and evaluated Shira Arias I managed the patient along with the ED Attending      Electronically Signed by         Gio Lopez MD  05/16/23 4689

## 2023-05-16 NOTE — DISCHARGE INSTRUCTIONS
Oral fluconazole was sent to your pharmacy  If you continue to have symptoms after 3 days please  your prescription  If your symptoms completely resolve you may cancel the prescription  Return to the ER if you develop:    Vaginal bleeding, abdominal pain, fever/chills, worsening vaginal discharge, worsening of your symptoms

## 2023-05-16 NOTE — ED NOTES
Pt and pts friend state they want to leave--they do not want any urine sent to lab or any medications given in ER or scripts sent to pharmacy due to having [facility] insurance  Asking for dc paperwork and will f/u with GYN in [place]  States they came here to get nexplanon removed and since we will not--they want to leave       Andrés Pollard RN  05/16/23 2272

## 2023-06-14 ENCOUNTER — OFFICE VISIT (OUTPATIENT)
Dept: FAMILY MEDICINE CLINIC | Facility: CLINIC | Age: 28
End: 2023-06-14
Payer: COMMERCIAL

## 2023-06-14 VITALS
HEART RATE: 75 BPM | OXYGEN SATURATION: 99 % | HEIGHT: 61 IN | BODY MASS INDEX: 30.78 KG/M2 | SYSTOLIC BLOOD PRESSURE: 100 MMHG | TEMPERATURE: 97.2 F | DIASTOLIC BLOOD PRESSURE: 56 MMHG | WEIGHT: 163 LBS | RESPIRATION RATE: 16 BRPM

## 2023-06-14 DIAGNOSIS — F33.1 MODERATE EPISODE OF RECURRENT MAJOR DEPRESSIVE DISORDER (HCC): Primary | ICD-10-CM

## 2023-06-14 NOTE — LETTER
June 14, 2023     [address] [place] [id number]    Patient: Zahra Flynn   YOB: 1995   Date of Visit: 6/14/2023       To whom it may concern:    Ms Zahra Flynn was seen in my office on 6/14/2023  Patient will benefit from a service dog to be present with her at her apartment complex  For further questions please call our office at [phone]            Sincerely,        Vinay Pelayo MD        CC: No Recipients

## 2023-06-15 ENCOUNTER — OFFICE VISIT (OUTPATIENT)
Dept: URGENT CARE | Facility: CLINIC | Age: 28
End: 2023-06-15
Payer: COMMERCIAL

## 2023-06-15 VITALS
OXYGEN SATURATION: 96 % | HEART RATE: 79 BPM | RESPIRATION RATE: 20 BRPM | SYSTOLIC BLOOD PRESSURE: 119 MMHG | BODY MASS INDEX: 30.4 KG/M2 | DIASTOLIC BLOOD PRESSURE: 56 MMHG | WEIGHT: 161 LBS | HEIGHT: 61 IN | TEMPERATURE: 97.7 F

## 2023-06-15 DIAGNOSIS — M62.838 MUSCLE SPASM: ICD-10-CM

## 2023-06-15 DIAGNOSIS — G25.89 SCAPULAR DYSKINESIS: Primary | ICD-10-CM

## 2023-06-15 PROCEDURE — 99214 OFFICE O/P EST MOD 30 MIN: CPT | Performed by: FAMILY MEDICINE

## 2023-06-15 RX ORDER — DICLOFENAC SODIUM 75 MG/1
75 TABLET, DELAYED RELEASE ORAL 2 TIMES DAILY
Qty: 60 TABLET | Refills: 0 | Status: SHIPPED | OUTPATIENT
Start: 2023-06-15

## 2023-06-15 NOTE — LETTER
To whom it may concern,      Shanita Soulier was seen in my office on 06/15/23  She may return to work on 06/17/2023  Thank you!       Sincerely,    Mini Bledsoe, DO

## 2023-06-15 NOTE — PROGRESS NOTES
"  [facility]        NAME: Eileen Liriano is a 32 y o  female  : 1995    MRN: [id number]  DATE: Kelsi 15, 2023  TIME: 7:17 PM    Assessment and Plan   Scapular dyskinesis [G25 89]  1  Scapular dyskinesis  Ambulatory Referral to Physical Therapy      2  Muscle spasm  diclofenac (VOLTAREN) 75 mg EC tablet            Patient Instructions     Scapular dyskinesis with periscapular pain  NSAIDs given for pain  Recommend PT  Follow up with PCP in 3-5 days  Proceed to  ER if symptoms worsen  Chief Complaint     Chief Complaint   Patient presents with   • Neck Pain     Neck pain radiating to scapula x 4 days         History of Present Illness       49-year-old female presents today complaining of left scapular pain  She states the pain has been present for 4 days, since she was playing with her child  She states that the pain is present with lifting  She notes that she was having difficulty putting on her shirt today due to the pain  She states that it feels like a stabbing sensation that radiates from the shoulder blade to the neck  She thought this was a \"kink\", but the pain has not resolved in its typical fashion  She presents today for further evaluation  Review of Systems   Review of Systems   Constitutional: Negative for chills, fatigue and fever  HENT: Negative for postnasal drip and sore throat  Respiratory: Negative for cough and shortness of breath  Cardiovascular: Negative for chest pain and palpitations  Gastrointestinal: Negative for abdominal pain, nausea and vomiting  Genitourinary: Negative for dysuria  Musculoskeletal: Positive for neck pain and neck stiffness  Negative for gait problem and joint swelling  Skin: Negative for rash  Neurological: Negative for dizziness, syncope, light-headedness, numbness and headaches  Psychiatric/Behavioral: Negative for agitation and confusion  All other systems reviewed and are negative          Current Medications " "      Current Outpatient Medications:   •  diclofenac (VOLTAREN) 75 mg EC tablet, Take 1 tablet (75 mg total) by mouth 2 (two) times a day, Disp: 60 tablet, Rfl: 0    Current Allergies     Allergies as of 06/15/2023 - Reviewed 06/15/2023   Allergen Reaction Noted   • Cefdinir Other (See Comments) 05/15/2014   • Ceftin [cefuroxime] Other (See Comments) 2016   • Dust mite extract  2014   • Nystatin Other (See Comments) 2016   • Pollen extract  2014            The following portions of the patient's history were reviewed and updated as appropriate: allergies, current medications, past family history, past medical history, past social history, past surgical history and problem list      Past Medical History:   Diagnosis Date   • Acute pain of right knee 2023   • Asthma     childhood   • Depression    • Obesity (BMI 30-39  9)        Past Surgical History:   Procedure Laterality Date   •  SECTION     • MOUTH SURGERY N/A        Family History   Problem Relation Age of Onset   • Asthma Mother    • Other Mother    • Diabetes Mother    • Other Maternal Grandmother          Medications have been verified  Objective   /56   Pulse 79   Temp 97 7 °F (36 5 °C)   Resp 20   Ht 5' 1\" (1 549 m)   Wt 73 kg (161 lb)   LMP 2023   SpO2 96%   BMI 30 42 kg/m²   Patient's last menstrual period was 2023  Physical Exam     Physical Exam  Vitals reviewed  Constitutional:       General: She is not in acute distress  Appearance: Normal appearance  She is not ill-appearing  HENT:      Head: Normocephalic and atraumatic  Eyes:      Extraocular Movements: Extraocular movements intact  Conjunctiva/sclera: Conjunctivae normal    Musculoskeletal:        Arms:       Cervical back: Normal range of motion  Comments: Scapular dyskinesis present  Positive periscapular trigger point with radiation to the neck  Posterior ribs present at ribs #3 through 5   " Skin:     General: Skin is warm  Neurological:      General: No focal deficit present  Mental Status: She is alert     Psychiatric:         Mood and Affect: Mood normal          Behavior: Behavior normal          Judgment: Judgment normal

## 2023-06-19 ENCOUNTER — TELEPHONE (OUTPATIENT)
Dept: PSYCHIATRY | Facility: CLINIC | Age: 28
End: 2023-06-19

## 2023-06-19 NOTE — TELEPHONE ENCOUNTER
Reached out to pt regarding routine referral  Please confirm pt insurance  Insurance is OON  Extra resource packet can be mailed

## 2023-06-27 NOTE — PROGRESS NOTES
"[facility] Office visit    Assessment/Plan:     1  Moderate episode of recurrent major depressive disorder [facility])  -     Ambulatory Referral to Psychiatry; Future     Note for therapy dog provided     No follow-ups on file  Subjective:   SYDNI Mccray is a 32 y o  female who presents for letter for therapy dog  She has received this letter in the past  She has a history of depression and would like to reestablish care with psychiatry  Denies SI/HI  She is currently not on any medication  Review of Systems   Constitutional: Negative for activity change, appetite change, chills, fatigue and fever  HENT: Negative for congestion  Respiratory: Negative for cough, shortness of breath and wheezing  Cardiovascular: Negative for chest pain and palpitations  Gastrointestinal: Negative for constipation, diarrhea, nausea and vomiting  Neurological: Negative for weakness, light-headedness and headaches  Psychiatric/Behavioral: Negative for behavioral problems, dysphoric mood and suicidal ideas  The patient is not nervous/anxious  Objective:     /56 (BP Location: Left arm, Patient Position: Sitting, Cuff Size: Standard)   Pulse 75   Temp (!) 97 2 °F (36 2 °C) (Tympanic)   Resp 16   Ht 5' 1\" (1 549 m)   Wt 73 9 kg (163 lb)   LMP 05/02/2023   SpO2 99%   BMI 30 80 kg/m²      Physical Exam  Constitutional:       Appearance: Normal appearance  HENT:      Head: Normocephalic and atraumatic  Nose: Nose normal       Mouth/Throat:      Mouth: Mucous membranes are moist    Cardiovascular:      Rate and Rhythm: Normal rate and regular rhythm  Pulses: Normal pulses  Heart sounds: Normal heart sounds  Pulmonary:      Effort: Pulmonary effort is normal       Breath sounds: Normal breath sounds  Abdominal:      General: Bowel sounds are normal  There is no distension  Tenderness: There is no abdominal tenderness     Neurological:      General: No focal " deficit present  Mental Status: She is alert and oriented to person, place, and time  Psychiatric:         Mood and Affect: Mood normal          Behavior: Behavior normal          Thought Content:  Thought content normal          Judgment: Judgment normal           ** Please Note: This note has been constructed using a voice recognition system **     Angel Paiz MD  06/27/23  10:24 AM

## 2023-06-29 NOTE — TELEPHONE ENCOUNTER
Reached out to pt regarding routine referral  Please confirm pt insurance  Insurance is OON  Extra resource packet can be mailed      Third attempt to reach pt  Referral closed

## 2023-09-18 ENCOUNTER — PATIENT OUTREACH (OUTPATIENT)
Dept: FAMILY MEDICINE CLINIC | Facility: CLINIC | Age: 28
End: 2023-09-18

## 2023-09-18 DIAGNOSIS — F90.9 ATTENTION DEFICIT HYPERACTIVITY DISORDER (ADHD), UNSPECIFIED ADHD TYPE: ICD-10-CM

## 2023-09-18 DIAGNOSIS — F41.9 ANXIETY: ICD-10-CM

## 2023-09-18 DIAGNOSIS — F33.1 MODERATE EPISODE OF RECURRENT MAJOR DEPRESSIVE DISORDER (HCC): Primary | ICD-10-CM

## 2023-09-18 NOTE — PROGRESS NOTES
PRIYA had received a referral from Michelle Bey MD r/t MDD, anxiety and ADHD. PRIYA had completed a chart review. Per chart, patient no showed today's appointment. Per chart, patient was referred to [address] [place] [address] however they do not accept [place] Medicaid. RIVAS had tried to call the patient via phone. RIVAS received a message that the number is either inactive, changed or disconnected. [facility] will attempt to outreach again at a later date and time. [facility] will continue to be available.

## 2023-09-25 ENCOUNTER — PATIENT OUTREACH (OUTPATIENT)
Dept: FAMILY MEDICINE CLINIC | Facility: CLINIC | Age: 28
End: 2023-09-25

## 2023-09-25 NOTE — LETTER
[address] [address]    Re: Unable to reach   9/25/2023       Dear Radha Fuentes,    I tried to reach you by phone and was unfortunately unable to reach you. I am the Outpatient Care Manager -  from [address] [place] [address]. I am following up from your last office visit.  Please give me a call at [phone] from 8am-4:30pm.    Sincerely,         MATHIEU Walden  Outpatient Care Manager -

## 2023-09-25 NOTE — PROGRESS NOTES
PRIYA had called the patient via phone. PRIYA unable to leave a voicemail as number is number is either inactive, changed or disconnected. RIVAS notes this is the second phone call attempt. RIVAS sent unable to reach letter via mail. RIVAS closed referral. Please reconsult SW for future needs.

## 2023-11-20 NOTE — TELEPHONE ENCOUNTER
[employer] health form needing completion placed in [employer] "Needs Attention" folder  Will need signature from Dr Helen Russell as she was examining physician for most recent CPE 
negative - no dysuria

## 2024-03-09 ENCOUNTER — OFFICE VISIT (OUTPATIENT)
Dept: URGENT CARE | Facility: CLINIC | Age: 29
End: 2024-03-09
Payer: COMMERCIAL

## 2024-03-09 VITALS
HEART RATE: 86 BPM | DIASTOLIC BLOOD PRESSURE: 68 MMHG | WEIGHT: 174 LBS | SYSTOLIC BLOOD PRESSURE: 121 MMHG | BODY MASS INDEX: 32.85 KG/M2 | HEIGHT: 61 IN | OXYGEN SATURATION: 98 % | TEMPERATURE: 97.4 F | RESPIRATION RATE: 18 BRPM

## 2024-03-09 DIAGNOSIS — B00.89 HERPETIC WHITLOW: Primary | ICD-10-CM

## 2024-03-09 PROCEDURE — 99213 OFFICE O/P EST LOW 20 MIN: CPT | Performed by: PHYSICIAN ASSISTANT

## 2024-03-09 RX ORDER — CLOBETASOL PROPIONATE 0.5 MG/G
CREAM TOPICAL 2 TIMES DAILY
Qty: 30 G | Refills: 0 | Status: SHIPPED | OUTPATIENT
Start: 2024-03-09

## 2024-03-09 RX ORDER — VALACYCLOVIR HYDROCHLORIDE 500 MG/1
500 TABLET, FILM COATED ORAL 2 TIMES DAILY
Qty: 6 TABLET | Refills: 0 | Status: SHIPPED | OUTPATIENT
Start: 2024-03-09 | End: 2024-03-12

## 2024-03-09 NOTE — PROGRESS NOTES
"  [facility]        NAME: Cathy Marks is a 28 y.o. 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 Herpetic ousmane  valACYclovir (VALTREX) 500 mg tablet    clobetasol (TEMOVATE) 0.05 % cream            Patient Instructions   Papulovesicular rash of L hand: We discussed differential includes herpetic ousmane vs dyshidrotic eczema. Will treat with Valtrex 500mg PO BID x 3 days as this is a recurrent outbreak. Take with food. Do not burst or open blisters or pustules as this may create secondary infection. Will also send in clobetasol ointment for topical use of the itching and inflammation to be used sparingly on the hands. We discussed derm follow up for official diagnosis. Red flag signs discussed.     Follow up with PCP in 3-5 days.  Proceed to  ER if symptoms worsen.    If tests have been performed at [facility], our office will contact you with results if changes need to be made to the care plan discussed with you at the visit.  [facility]    Chief Complaint     Chief Complaint   Patient presents with    finger blisters     Has 2 exudate filled blisters on right middle finger has had reoccurring blisters in same spot previously          History of Present Illness       The patient presents today for fluid filled blisters on the R middle finger x three days. She states that the area is pruritic. She also notes tenderness and pain with ROM of the digit. She states that she has been experiencing these sx intermittently for a \"few months\". She states that she thought it was from an injury from her dogs leash. She did note mild burning before the lesion appears.  She states that her last episode resolved without intervention. No fever or chills. No trauma or injury this time. No weakness, numbness of the finger. No new medications, hand lotions. No OTC measures. No hx of " eczema.         Review of Systems   Review of Systems   Constitutional:  Negative for activity change, appetite change, chills, diaphoresis, fatigue and fever.   HENT:  Negative for facial swelling, sore throat and trouble swallowing.    Respiratory:  Negative for chest tightness, shortness of breath, wheezing and stridor.    Cardiovascular:  Negative for chest pain and palpitations.   Skin:  Positive for rash.   Allergic/Immunologic: Negative for environmental allergies, food allergies and immunocompromised state.   Neurological:  Negative for dizziness and light-headedness.   Hematological:  Negative for adenopathy. Does not bruise/bleed easily.         Current Medications       Current Outpatient Medications:     clobetasol (TEMOVATE) 0.05 % cream, Apply topically 2 (two) times a day, Disp: 30 g, Rfl: 0    valACYclovir (VALTREX) 500 mg tablet, Take 1 tablet (500 mg total) by mouth 2 (two) times a day for 3 days, Disp: 6 tablet, Rfl: 0    diclofenac (VOLTAREN) 75 mg EC tablet, Take 1 tablet (75 mg total) by mouth 2 (two) times a day (Patient not taking: Reported on 3/9/2024), Disp: 60 tablet, Rfl: 0    Current Allergies     Allergies as of 2024 - Reviewed 2024   Allergen Reaction Noted    Cefdinir Other (See Comments) 05/15/2014    Ceftin [cefuroxime] Other (See Comments) 2016    Dust mite extract  2014    Nystatin Other (See Comments) 2016    Pollen extract  2014            The following portions of the patient's history were reviewed and updated as appropriate: allergies, current medications, past family history, past medical history, past social history, past surgical history and problem list.     Past Medical History:   Diagnosis Date    Acute pain of right knee 2023    Asthma     childhood    Depression     Obesity (BMI 30-39.9)        Past Surgical History:   Procedure Laterality Date     SECTION      MOUTH SURGERY N/A        Family History   Problem  "Relation Age of Onset    Asthma Mother     Other Mother     Diabetes Mother     Other Maternal Grandmother          Medications have been verified.        Objective   /68   Pulse 86   Temp (!) 97.4 °F (36.3 °C)   Resp 18   Ht 5' 1\" (1.549 m)   Wt 78.9 kg (174 lb)   SpO2 98%   BMI 32.88 kg/m²   No LMP recorded. (Menstrual status: Birth Control).       Physical Exam     Physical Exam  Vitals and nursing note reviewed.   Constitutional:       General: She is not in acute distress.     Appearance: She is well-developed. She is not ill-appearing, toxic-appearing or diaphoretic.   HENT:      Mouth/Throat:      Pharynx: Uvula midline.   Cardiovascular:      Rate and Rhythm: Normal rate and regular rhythm.      Pulses: Normal pulses.      Heart sounds: Normal heart sounds, S1 normal and S2 normal. No murmur heard.  Pulmonary:      Effort: Pulmonary effort is normal. No tachypnea, accessory muscle usage or respiratory distress.      Breath sounds: Normal breath sounds. No stridor. No decreased breath sounds, wheezing, rhonchi or rales.   Skin:     Capillary Refill: Capillary refill takes less than 2 seconds.      Findings: Rash present. Rash is vesicular.      Comments: Left Middle Digit: On the volar aspect there is two pustular lesions surrounded by erythema and edema, no drainage and mild tenderness. On the dorsal aspect there is small, tiny papulovesicular lesions that are pruritic over the distal phalanx.                    "

## 2024-03-09 NOTE — PATIENT INSTRUCTIONS
Papulovesicular rash of L hand: We discussed differential includes herpetic ousmane vs dyshidrotic eczema. Will treat with Valtrex 500mg PO BID x 3 days as this is a recurrent outbreak. Take with food. Do not burst or open blisters or pustules as this may create secondary infection. Will also send in clobetasol ointment for topical use of the itching and inflammation to be used sparingly on the hands. We discussed derm follow up for official diagnosis. Red flag signs discussed.

## 2024-05-04 ENCOUNTER — HOSPITAL ENCOUNTER (EMERGENCY)
Facility: HOSPITAL | Age: 29
Discharge: HOME | End: 2024-05-04
Attending: STUDENT IN AN ORGANIZED HEALTH CARE EDUCATION/TRAINING PROGRAM
Payer: COMMERCIAL

## 2024-05-04 ENCOUNTER — APPOINTMENT (EMERGENCY)
Dept: RADIOLOGY | Facility: HOSPITAL | Age: 29
End: 2024-05-04
Payer: COMMERCIAL

## 2024-05-04 VITALS
BODY MASS INDEX: 31.93 KG/M2 | TEMPERATURE: 98 F | WEIGHT: 169 LBS | HEART RATE: 78 BPM | RESPIRATION RATE: 18 BRPM | OXYGEN SATURATION: 99 % | DIASTOLIC BLOOD PRESSURE: 76 MMHG | SYSTOLIC BLOOD PRESSURE: 137 MMHG

## 2024-05-04 DIAGNOSIS — N93.9 VAGINAL BLEEDING: Primary | ICD-10-CM

## 2024-05-04 DIAGNOSIS — N39.0 UTI (URINARY TRACT INFECTION): ICD-10-CM

## 2024-05-04 DIAGNOSIS — O20.0 THREATENED MISCARRIAGE: ICD-10-CM

## 2024-05-04 LAB
ABO GROUP BLD: NORMAL
ALBUMIN SERPL BCP-MCNC: 4.1 G/DL (ref 3.5–5)
ALP SERPL-CCNC: 53 U/L (ref 34–104)
ALT SERPL W P-5'-P-CCNC: 12 U/L (ref 7–52)
ANION GAP SERPL CALCULATED.3IONS-SCNC: 7 MMOL/L (ref 4–13)
AST SERPL W P-5'-P-CCNC: 20 U/L (ref 13–39)
B-HCG SERPL-ACNC: ABNORMAL MIU/ML (ref 0–5)
BACTERIA UR QL AUTO: ABNORMAL /HPF
BASOPHILS # BLD AUTO: 0.04 THOUSANDS/ÂΜL (ref 0–0.1)
BASOPHILS NFR BLD AUTO: 1 % (ref 0–1)
BILIRUB SERPL-MCNC: 0.61 MG/DL (ref 0.2–1)
BILIRUB UR QL STRIP: NEGATIVE
BUN SERPL-MCNC: 6 MG/DL (ref 5–25)
CALCIUM SERPL-MCNC: 8.4 MG/DL (ref 8.4–10.2)
CHLORIDE SERPL-SCNC: 104 MMOL/L (ref 96–108)
CLARITY UR: ABNORMAL
CO2 SERPL-SCNC: 22 MMOL/L (ref 21–32)
COLOR UR: COLORLESS
CREAT SERPL-MCNC: 0.61 MG/DL (ref 0.6–1.3)
EOSINOPHIL # BLD AUTO: 0.04 THOUSAND/ÂΜL (ref 0–0.61)
EOSINOPHIL NFR BLD AUTO: 1 % (ref 0–6)
ERYTHROCYTE [DISTWIDTH] IN BLOOD BY AUTOMATED COUNT: 14.4 % (ref 11.6–15.1)
EXT PREGNANCY TEST URINE: POSITIVE
EXT. CONTROL: ABNORMAL
GFR SERPL CREATININE-BSD FRML MDRD: 123 ML/MIN/1.73SQ M
GLUCOSE SERPL-MCNC: 85 MG/DL (ref 65–140)
GLUCOSE UR STRIP-MCNC: NEGATIVE MG/DL
HCT VFR BLD AUTO: 38.4 % (ref 34.8–46.1)
HGB BLD-MCNC: 12.7 G/DL (ref 11.5–15.4)
HGB UR QL STRIP.AUTO: ABNORMAL
IMM GRANULOCYTES # BLD AUTO: 0.02 THOUSAND/UL (ref 0–0.2)
IMM GRANULOCYTES NFR BLD AUTO: 0 % (ref 0–2)
KETONES UR STRIP-MCNC: NEGATIVE MG/DL
LEUKOCYTE ESTERASE UR QL STRIP: ABNORMAL
LYMPHOCYTES # BLD AUTO: 1.96 THOUSANDS/ÂΜL (ref 0.6–4.47)
LYMPHOCYTES NFR BLD AUTO: 34 % (ref 14–44)
MCH RBC QN AUTO: 25.4 PG (ref 26.8–34.3)
MCHC RBC AUTO-ENTMCNC: 33.1 G/DL (ref 31.4–37.4)
MCV RBC AUTO: 77 FL (ref 82–98)
MONOCYTES # BLD AUTO: 0.39 THOUSAND/ÂΜL (ref 0.17–1.22)
MONOCYTES NFR BLD AUTO: 7 % (ref 4–12)
NEUTROPHILS # BLD AUTO: 3.36 THOUSANDS/ÂΜL (ref 1.85–7.62)
NEUTS SEG NFR BLD AUTO: 57 % (ref 43–75)
NITRITE UR QL STRIP: NEGATIVE
NON-SQ EPI CELLS URNS QL MICRO: ABNORMAL /HPF
NRBC BLD AUTO-RTO: 0 /100 WBCS
PH UR STRIP.AUTO: 7 [PH]
PLATELET # BLD AUTO: 243 THOUSANDS/UL (ref 149–390)
PMV BLD AUTO: 10.4 FL (ref 8.9–12.7)
POTASSIUM SERPL-SCNC: 4 MMOL/L (ref 3.5–5.3)
PROT SERPL-MCNC: 7.4 G/DL (ref 6.4–8.4)
PROT UR STRIP-MCNC: NEGATIVE MG/DL
RBC # BLD AUTO: 5 MILLION/UL (ref 3.81–5.12)
RBC #/AREA URNS AUTO: ABNORMAL /HPF
RH BLD: POSITIVE
SODIUM SERPL-SCNC: 133 MMOL/L (ref 135–147)
SP GR UR STRIP.AUTO: <1.005 (ref 1–1.03)
UROBILINOGEN UR STRIP-ACNC: <2 MG/DL
WBC # BLD AUTO: 5.81 THOUSAND/UL (ref 4.31–10.16)
WBC #/AREA URNS AUTO: ABNORMAL /HPF

## 2024-05-04 PROCEDURE — 87077 CULTURE AEROBIC IDENTIFY: CPT | Performed by: STUDENT IN AN ORGANIZED HEALTH CARE EDUCATION/TRAINING PROGRAM

## 2024-05-04 PROCEDURE — 87186 SC STD MICRODIL/AGAR DIL: CPT | Performed by: STUDENT IN AN ORGANIZED HEALTH CARE EDUCATION/TRAINING PROGRAM

## 2024-05-04 PROCEDURE — 80053 COMPREHEN METABOLIC PANEL: CPT | Performed by: STUDENT IN AN ORGANIZED HEALTH CARE EDUCATION/TRAINING PROGRAM

## 2024-05-04 PROCEDURE — 76801 OB US < 14 WKS SINGLE FETUS: CPT

## 2024-05-04 PROCEDURE — 87086 URINE CULTURE/COLONY COUNT: CPT | Performed by: STUDENT IN AN ORGANIZED HEALTH CARE EDUCATION/TRAINING PROGRAM

## 2024-05-04 PROCEDURE — 85025 COMPLETE CBC W/AUTO DIFF WBC: CPT | Performed by: STUDENT IN AN ORGANIZED HEALTH CARE EDUCATION/TRAINING PROGRAM

## 2024-05-04 PROCEDURE — 81001 URINALYSIS AUTO W/SCOPE: CPT | Performed by: STUDENT IN AN ORGANIZED HEALTH CARE EDUCATION/TRAINING PROGRAM

## 2024-05-04 PROCEDURE — 86900 BLOOD TYPING SEROLOGIC ABO: CPT | Performed by: STUDENT IN AN ORGANIZED HEALTH CARE EDUCATION/TRAINING PROGRAM

## 2024-05-04 PROCEDURE — 36415 COLL VENOUS BLD VENIPUNCTURE: CPT | Performed by: STUDENT IN AN ORGANIZED HEALTH CARE EDUCATION/TRAINING PROGRAM

## 2024-05-04 PROCEDURE — 99284 EMERGENCY DEPT VISIT MOD MDM: CPT | Performed by: STUDENT IN AN ORGANIZED HEALTH CARE EDUCATION/TRAINING PROGRAM

## 2024-05-04 PROCEDURE — 99284 EMERGENCY DEPT VISIT MOD MDM: CPT

## 2024-05-04 PROCEDURE — 81025 URINE PREGNANCY TEST: CPT | Performed by: STUDENT IN AN ORGANIZED HEALTH CARE EDUCATION/TRAINING PROGRAM

## 2024-05-04 PROCEDURE — 84702 CHORIONIC GONADOTROPIN TEST: CPT | Performed by: STUDENT IN AN ORGANIZED HEALTH CARE EDUCATION/TRAINING PROGRAM

## 2024-05-04 PROCEDURE — 86901 BLOOD TYPING SEROLOGIC RH(D): CPT | Performed by: STUDENT IN AN ORGANIZED HEALTH CARE EDUCATION/TRAINING PROGRAM

## 2024-05-04 RX ORDER — NITROFURANTOIN 25; 75 MG/1; MG/1
100 CAPSULE ORAL 2 TIMES DAILY
Qty: 10 CAPSULE | Refills: 0 | Status: SHIPPED | OUTPATIENT
Start: 2024-05-04 | End: 2024-05-04

## 2024-05-04 RX ORDER — NITROFURANTOIN MACROCRYSTALS 100 MG/1
100 CAPSULE ORAL 2 TIMES DAILY
Qty: 10 CAPSULE | Refills: 0 | Status: SHIPPED | OUTPATIENT
Start: 2024-05-04 | End: 2024-05-09

## 2024-05-04 NOTE — ED NOTES
RN did give US a call but did not , RN left a message also marked patient ready.  As per US schedule they should be here until 12 noon.     Mary Ann Haines RN  05/04/24 0974

## 2024-05-04 NOTE — ED PROVIDER NOTES
History  Chief Complaint   Patient presents with    Vaginal Bleeding - Pregnant     Patient states she had small amounts of faint blood on toilet paper after wiping herself this morning.  States she is six weeks pregnant.  Concerned because she has a history of a miscarriage.  Denies any pain.     Patient is a 28-year-old female, past medical history including asthma, depression, , currently approximately 6 weeks pregnant, who presents to the emergency department for vaginal bleeding.  Patient states her last normal menstrual period was in mid March.  She has taken several home pregnancy test and they have been positive.  Was asymptomatic till this morning when after going to the bathroom she wiped and noticed blood on the toilet paper.  She now presents for further evaluation.  She is concerned given her last pregnancy ended up in a miscarriage.  Denies any abdominal pain or cramping.  Has not had any confirmatory testing for pregnancy yet.  No other complaints or concerns.        Prior to Admission Medications   Prescriptions Last Dose Informant Patient Reported? Taking?   clobetasol (TEMOVATE) 0.05 % cream   No No   Sig: Apply topically 2 (two) times a day   diclofenac (VOLTAREN) 75 mg EC tablet   No No   Sig: Take 1 tablet (75 mg total) by mouth 2 (two) times a day   Patient not taking: Reported on 3/9/2024   valACYclovir (VALTREX) 500 mg tablet   No No   Sig: Take 1 tablet (500 mg total) by mouth 2 (two) times a day for 3 days      Facility-Administered Medications: None       Past Medical History:   Diagnosis Date    Acute pain of right knee 2023    Asthma     childhood    Depression     Obesity (BMI 30-39.9)        Past Surgical History:   Procedure Laterality Date     SECTION  2014    MOUTH SURGERY N/A        Family History   Problem Relation Age of Onset    Asthma Mother     Other Mother     Diabetes Mother     Other Maternal Grandmother      I have reviewed and agree with the history as  documented.    E-Cigarette/Vaping    E-Cigarette Use Never User      E-Cigarette/Vaping Substances    Nicotine No     THC No     CBD No     Flavoring No     Other No     Unknown No      Social History     Tobacco Use    Smoking status: Never     Passive exposure: Past    Smokeless tobacco: Never   Vaping Use    Vaping status: Never Used   Substance Use Topics    Alcohol use: Yes     Comment: rarely, every few months     Drug use: Never       Review of Systems   Constitutional:  Negative for chills and fever.   Gastrointestinal:  Negative for abdominal pain.   Genitourinary:  Positive for vaginal bleeding.   All other systems reviewed and are negative.      Physical Exam  Physical Exam  Vitals and nursing note reviewed.   Constitutional:       General: She is not in acute distress.     Appearance: She is well-developed. She is not ill-appearing, toxic-appearing or diaphoretic.   HENT:      Head: Normocephalic and atraumatic.      Right Ear: External ear normal.      Left Ear: External ear normal.      Nose: Nose normal.   Eyes:      General: Lids are normal. No scleral icterus.  Cardiovascular:      Rate and Rhythm: Normal rate and regular rhythm.   Pulmonary:      Effort: Pulmonary effort is normal. No respiratory distress.   Abdominal:      Palpations: Abdomen is soft.      Tenderness: There is no abdominal tenderness. There is no guarding or rebound.   Musculoskeletal:         General: No deformity. Normal range of motion.      Cervical back: Normal range of motion and neck supple.   Skin:     General: Skin is warm and dry.   Neurological:      General: No focal deficit present.      Mental Status: She is alert.   Psychiatric:         Mood and Affect: Mood normal.         Behavior: Behavior normal.         Vital Signs  ED Triage Vitals [05/04/24 1027]   Temperature Pulse Respirations Blood Pressure SpO2   98.1 °F (36.7 °C) 68 19 138/74 99 %      Temp Source Heart Rate Source Patient Position - Orthostatic VS BP  Location FiO2 (%)   Tympanic Monitor Sitting Left arm --      Pain Score       No Pain           Vitals:    05/04/24 1027 05/04/24 1353   BP: 138/74 137/76   Pulse: 68 78   Patient Position - Orthostatic VS: Sitting Sitting         Visual Acuity      ED Medications  Medications - No data to display    Diagnostic Studies  Results Reviewed       Procedure Component Value Units Date/Time    hCG, quantitative [[id number]]  (Abnormal) Collected: 05/04/24 1115    Lab Status: Final result Specimen: Blood from Arm, Left Updated: 05/04/24 1213     HCG, Quant 38,836.4 mIU/mL     Narrative:       Expected Ranges:    HCG results between 5.0 and 25.0 mIU/mL may be indicative of early pregnancy but should be interpreted in light of the total clinical presentation.    HCG can rise to detectable levels in omero and post menopausal women (0-11.6 mIU/mL).     Approximate               Approximate HCG  Gestation age          Concentration ( mIU/mL)  _____________          ______________________   Weeks                      HCG values  0.2-1                       5-50  1-2                           2-3                         100-5000  3-4                         500-22508  4-5                         1000-81732  5-6                         36194-632405  6-8                         23498-454242  8-12                        57555-597257      Comprehensive metabolic panel [[id number]]  (Abnormal) Collected: 05/04/24 1115    Lab Status: Final result Specimen: Blood from Arm, Left Updated: 05/04/24 1142     Sodium 133 mmol/L      Potassium 4.0 mmol/L      Chloride 104 mmol/L      CO2 22 mmol/L      ANION GAP 7 mmol/L      BUN 6 mg/dL      Creatinine 0.61 mg/dL      Glucose 85 mg/dL      Calcium 8.4 mg/dL      AST 20 U/L      ALT 12 U/L      Alkaline Phosphatase 53 U/L      Total Protein 7.4 g/dL      Albumin 4.1 g/dL      Total Bilirubin 0.61 mg/dL      eGFR 123 ml/min/1.73sq m     Narrative:      [employer] Kid[employer] Foundation  guidelines for Chronic Kidney Disease (CKD):     Stage 1 with normal or high GFR (GFR > 90 mL/min/1.73 square meters)    Stage 2 Mild CKD (GFR = 60-89 mL/min/1.73 square meters)    Stage 3A Moderate CKD (GFR = 45-59 mL/min/1.73 square meters)    Stage 3B Moderate CKD (GFR = 30-44 mL/min/1.73 square meters)    Stage 4 Severe CKD (GFR = 15-29 mL/min/1.73 square meters)    Stage 5 End Stage CKD (GFR <15 mL/min/1.73 square meters)  Note: GFR calculation is accurate only with a steady state creatinine    Urine Microscopic [[id number]]  (Abnormal) Collected: 05/04/24 1116    Lab Status: Final result Specimen: Urine, Clean Catch Updated: 05/04/24 1135     RBC, UA 0-1 /hpf      WBC, UA Innumerable /hpf      Epithelial Cells Occasional /hpf      Bacteria, UA Moderate /hpf      URINE COMMENT --    UA w Reflex to Microscopic w Reflex to Culture [[id number]]  (Abnormal) Collected: 05/04/24 1116    Lab Status: Final result Specimen: Urine, Clean Catch Updated: 05/04/24 1125     Color, UA Colorless     Clarity, UA Cloudy     Specific Gravity, UA <1.005     pH, UA 7.0     Leukocytes, UA Large     Nitrite, UA Negative     Protein, UA Negative mg/dl      Glucose, UA Negative mg/dl      Ketones, UA Negative mg/dl      Urobilinogen, UA <2.0 mg/dl      Bilirubin, UA Negative     Occult Blood, UA Trace     URINE COMMENT --    Urine culture [[id number]] Collected: 05/04/24 1116    Lab Status: In process Specimen: Urine, Clean Catch Updated: 05/04/24 1125    CBC and differential [[id number]]  (Abnormal) Collected: 05/04/24 1115    Lab Status: Final result Specimen: Blood from Arm, Left Updated: 05/04/24 1125     WBC 5.81 Thousand/uL      RBC 5.00 Million/uL      Hemoglobin 12.7 g/dL      Hematocrit 38.4 %      MCV 77 fL      MCH 25.4 pg      MCHC 33.1 g/dL      RDW 14.4 %      MPV 10.4 fL      Platelets 243 Thousands/uL      nRBC 0 /100 WBCs      Segmented % 57 %      Immature Grans % 0 %      Lymphocytes % 34 %      Monocytes % 7 %       Eosinophils Relative 1 %      Basophils Relative 1 %      Absolute Neutrophils 3.36 Thousands/µL      Absolute Immature Grans 0.02 Thousand/uL      Absolute Lymphocytes 1.96 Thousands/µL      Absolute Monocytes 0.39 Thousand/µL      Eosinophils Absolute 0.04 Thousand/µL      Basophils Absolute 0.04 Thousands/µL     POCT pregnancy, urine [[id number]]  (Abnormal) Resulted: 05/04/24 1115    Lab Status: Final result Updated: 05/04/24 1115     EXT Preg Test, Ur Positive     Control Valid                   US OB < 14 weeks with transvaginal   Final Result by Rachel Silva MD (05/04 1336)      Single live intrauterine gestation at 6 weeks 1 days (range +/- ).      ELLEN of 12/27/2024.         Workstation performed: [id number]                    Procedures  Procedures         ED Course  ED Course as of 05/04/24 1529   Sat May 04, 2024   1120 PREGNANCY TEST URINE(!): Positive   1135 Bacteria, UA(!): Moderate  Will treat   1214 HCG QUANTITATIVE(!): 38,836.4   1340 US OB < 14 weeks with transvaginal  Single live intrauterine gestation at 6 weeks 1 days (range +/- ).     ELLEN of 12/27/2024.     1350 Discussed with OBGYN given allergies to several antibiotics.  Okay to treat UTI with Macrodantin.   1355 Patient reevaluated.  Resting comfortably.  No new complaints.  Discussed results and findings.  Explained presentation consistent with threatened miscarriage.  Discussed UTI and treatment.  Will discharge.  Discussed OB/GYN follow-up.  Return precautions discussed.  Patient verbalized understanding and agreed to plan of care.                                             Medical Decision Making  Patient is a 28 y.o. female who presents to the ED for vaginal bleeding in the setting of an early pregnancy.  Patient is nontoxic, well-appearing.  Vitals are stable.  Physical exam is unremarkable..    Differential includes but is not limited to: Bleeding of early pregnancy, miscarriage, ectopic.    Plan: Labs, transvaginal ultrasound to  "rule out ectopic, reassess                 Portions of the record may have been created with voice recognition software. Occasional wrong word or \"sound a like\" substitutions may have occurred due to the inherent limitations of voice recognition software. Read the chart carefully and recognize, using context, where substitutions have occurred.    Amount and/or Complexity of Data Reviewed  Labs: ordered. Decision-making details documented in ED Course.  Radiology: ordered. Decision-making details documented in ED Course.    Risk  Prescription drug management.             Disposition  Final diagnoses:   Vaginal bleeding   Threatened miscarriage   UTI (urinary tract infection)     Time reflects when diagnosis was documented in both MDM as applicable and the Disposition within this note       Time User Action Codes Description Comment    5/4/2024  1:41 PM Joe Ace [N93.9] Vaginal bleeding     5/4/2024  1:41 PM Joe Ace [O20.0] Threatened miscarriage     5/4/2024  1:51 PM Joe Ace [N39.0] UTI (urinary tract infection)           ED Disposition       ED Disposition   Discharge    Condition   Stable    Date/Time   Sat May 4, 2024 11:51 AM    Comment   Cathy Marks discharge to home/self care.                   Follow-up Information       Follow up With Specialties Details Why Contact Info Additional Information    Infolink  Call in 1 day  [phone]       [facility] Emergency Department Emergency Medicine   [address] [place] [id number] [phone] [facility] Emergency Department, [address] [place] [address], [id number]            Discharge Medication List as of 5/4/2024  1:54 PM        START taking these medications    Details   nitrofurantoin (MACRODANTIN) 100 mg capsule Take 1 capsule (100 mg total) by mouth 2 (two) times a day for 5 days, Starting Sat 5/4/2024, Until Thu 5/9/2024, Normal           CONTINUE these " medications which have NOT CHANGED    Details   clobetasol (TEMOVATE) 0.05 % cream Apply topically 2 (two) times a day, Starting Sat 3/9/2024, Normal      diclofenac (VOLTAREN) 75 mg EC tablet Take 1 tablet (75 mg total) by mouth 2 (two) times a day, Starting Thu 6/15/2023, Normal      valACYclovir (VALTREX) 500 mg tablet Take 1 tablet (500 mg total) by mouth 2 (two) times a day for 3 days, Starting Sat 3/9/2024, Until Tue 3/12/2024, Normal             No discharge procedures on file.    PDMP Review       None            ED Provider  Electronically Signed by             Joe Ace DO  05/04/24 1592

## 2024-05-04 NOTE — DISCHARGE INSTRUCTIONS
You were seen in the emergency room for vaginal bleeding.  Your ultrasound showed a single live intrauterine gestation at approximately 6 weeks age.  Please follow-up with your OB/GYN.  Return the emergency room for any worsening bleeding, pain, discharge, or for any other new or concerning symptoms.

## 2024-05-06 LAB — BACTERIA UR CULT: ABNORMAL

## 2024-05-07 ENCOUNTER — APPOINTMENT (OUTPATIENT)
Dept: LAB | Facility: HOSPITAL | Age: 29
End: 2024-05-07
Payer: COMMERCIAL

## 2024-05-07 DIAGNOSIS — O46.90 VAGINAL BLEEDING IN PREGNANCY: Primary | ICD-10-CM

## 2024-05-07 LAB — B-HCG SERPL-ACNC: ABNORMAL MIU/ML (ref 0–5)

## 2024-05-07 PROCEDURE — 36415 COLL VENOUS BLD VENIPUNCTURE: CPT

## 2024-05-07 PROCEDURE — 84702 CHORIONIC GONADOTROPIN TEST: CPT

## 2024-05-09 ENCOUNTER — ULTRASOUND (OUTPATIENT)
Dept: OBGYN CLINIC | Facility: CLINIC | Age: 29
End: 2024-05-09
Payer: COMMERCIAL

## 2024-05-09 VITALS
SYSTOLIC BLOOD PRESSURE: 112 MMHG | HEIGHT: 61 IN | DIASTOLIC BLOOD PRESSURE: 80 MMHG | WEIGHT: 170 LBS | BODY MASS INDEX: 32.1 KG/M2

## 2024-05-09 DIAGNOSIS — N91.2 AMENORRHEA: Primary | ICD-10-CM

## 2024-05-09 PROCEDURE — 99213 OFFICE O/P EST LOW 20 MIN: CPT | Performed by: OBSTETRICS & GYNECOLOGY

## 2024-05-09 PROCEDURE — 76817 TRANSVAGINAL US OBSTETRIC: CPT | Performed by: OBSTETRICS & GYNECOLOGY

## 2024-05-09 NOTE — PROGRESS NOTES
"S: 28 y.o.   who presents for viability scan with LMP of 3/20/2024. She is 7 weeks and 1 days by her LMP.  She was at the emergency room a few days ago due to some light spotting which is resolved, ultrasound that day showed a 6-week size pregnancy with good fetal heart tones and consistent with 2024 due date.  She no longer has any cramping or vaginal bleeding. This is an unplanned but desired to be kept pregnancy, she and her partner had broken up for a little bit and are working things out. Her previous pregnancies delivered by  due to drops in fetal heartbeat.     Past Medical History:   Diagnosis Date    Acute pain of right knee 2023    Asthma     childhood    Depression     Obesity (BMI 30-39.9)        OB History    Para Term  AB Living   3 1 1   1 1   SAB IAB Ectopic Multiple Live Births   1       1      # Outcome Date GA Lbr Danny/2nd Weight Sex Delivery Anes PTL Lv   3 Current            2 Term 14 39w0d  3175 g (7 lb) M CS-LTranv Spinal N JAMI   1 SAB                 O:  Vitals:    24 1301   BP: 112/80   BP Location: Left arm   Patient Position: Sitting   Weight: 77.1 kg (170 lb)   Height: 5' 1\" (1.549 m)        MRSA: No  Varicella: Patient thinks she had, her mother thinks she might of had the immunization  STD hx: None    TVUS: viable, bates  IUP at 6 weeks 2 days with CRL 5.6 mm. . ELLEN 2024. Final dating via lmp.      A/P:  #1. IUP at 6 weeks and 2 days  - Viable pregnancy on TVUS  - RTC in 2 week for nurse intake visit, message sent to staff to get varicella titers with initial blood work.  Briefly counseled on options of trial labor versus repeat     Problem List Items Addressed This Visit    None      OB/GYN  2024  1:11 PM    "

## 2024-05-10 ENCOUNTER — TELEPHONE (OUTPATIENT)
Age: 29
End: 2024-05-10

## 2024-05-10 ENCOUNTER — INITIAL PRENATAL (OUTPATIENT)
Dept: OBGYN CLINIC | Facility: CLINIC | Age: 29
End: 2024-05-10

## 2024-05-10 VITALS
WEIGHT: 170 LBS | HEART RATE: 78 BPM | OXYGEN SATURATION: 98 % | SYSTOLIC BLOOD PRESSURE: 110 MMHG | BODY MASS INDEX: 32.1 KG/M2 | RESPIRATION RATE: 14 BRPM | HEIGHT: 61 IN | DIASTOLIC BLOOD PRESSURE: 62 MMHG

## 2024-05-10 DIAGNOSIS — Z34.81 ENCOUNTER FOR SUPERVISION OF OTHER NORMAL PREGNANCY, FIRST TRIMESTER: Primary | ICD-10-CM

## 2024-05-10 DIAGNOSIS — Z31.430 ENCOUNTER OF FEMALE FOR TESTING FOR GENETIC DISEASE CARRIER STATUS FOR PROCREATIVE MANAGEMENT: ICD-10-CM

## 2024-05-10 PROCEDURE — OBC: Performed by: OBSTETRICS & GYNECOLOGY

## 2024-05-10 RX ORDER — PNV NO.95/FERROUS FUM/FOLIC AC 28MG-0.8MG
1 TABLET ORAL DAILY
COMMUNITY

## 2024-05-10 NOTE — PATIENT INSTRUCTIONS
Congratulations!! Please review our Pregnancy Essential Guide and [facility] L&D Virtual tour from our networks website.     [facility] Pregnancy Essentials Guide  [facility] ([email or web address])     [facility] ([employer] [facility])

## 2024-05-10 NOTE — PROGRESS NOTES
Details that I feel the provider should be aware of:   -h/o   d/t drops in fetal heartbeat  -h/o sab in   -unknown date of last pap, believes might have been in prev pregnancy   -referral to MFM placed at this time  -GEMS completed  -CF, SMA, varicella and early 1hr glucose indicated and ordered  -24 evaluated in ED for VB/spotting, denies any further episodes noted    OB INTAKE INTERVIEW  Patient is 28 y.o.y.o. who presents for OB intake at 7w2d  She is present alone during this encounter  Patient reports she is not together with father of her baby and he is not involved at this time in the pregnancy, he is 28 years old.      Last Menstrual Period: 3/20/24  Ultrasound: Measured 6 weeks 2 days on 24. First ultrasound documented in ED on 24 6w1d with avni 24.  Estimated Date of Delivery: 24 by LMP (reviewed with provider via staff message regarding dating and setting AVNI. Per Dr. Cruz, AVNI is 24 based on LMP, patient w h/o regular periods and exact date 3/20/24.    Signs/Symptoms of Pregnancy  Current pregnancy symptoms: mild nausea in AM, resolves after eating  Constipation no  Headaches YES - notices they occur when working (works at [employer]) would get them prior to pregnancy at work as well. Patient reports they are quickly resolved with rest at home. Has not needed to take tylenol. Reviewed hydrating, avoiding trigger factors as able, and tylenol recommendations in pregnancy and when to call office.  Cramping/spotting YES - patient reports intermittent mild cramping. Recent VB/spotting over the weekend, was evaluated in ED, no further spotting noted. Reviewed when to call office.  PICA cravings no    Diabetes- Bmi 32.12  If patient has 1 or more, please order early 1 hour GTT  History of GDM no  BMI >35 no  History of PCOS or current metformin use no  History of LGA/macrosomic infant (4000g/9lbs) no    If patient has 2 or more, please order early 1  hour GTT  BMI>30 YES  AMA no  First degree relative with type 2 diabetes YES  History of chronic HTN, hyperlipidemia, elevated A1C no  High risk race (, , ,  or ) no    Hypertension- if you answer yes to any of the following, please order baseline preeclampsia labs (cbc, comprehensive metabolic panel, urine protein creatinine ratio, uric acid)  History of of chronic HTN no  History of gestational HTN no  History of preeclampsia, eclampsia, or HELLP syndrome no  History of diabetes no  History of lupus, autoimmune disease, kidney disease no    Thyroid- if yes order TSH with reflex T4  History of thyroid disease no    Bleeding Disorder or Hx of DVT-patient or first degree relative with history of. Order the following if not done previously.   (Factor V, antithrombin III, prothrombin gene mutation, protein C and S Ag, lupus anticoagulant, anticardiolipin, beta-2 glycoprotein)   no    OB/GYN-  History of abnormal pap smear no       Date of last pap smear unknown  History of HPV no  History of Herpes/HSV YES - patient reports herpes on finger onset 10/2023, was previously prescribed/completed treatment.  History of other STI (gonorrhea, chlamydia, trich) no  History of prior  no  History of prior  YES  History of  delivery prior to 36 weeks 6 days no  History of blood transfusion no  Ok for blood transfusion yes    Substance screening-   History of tobacco use no  Currently using tobacco no  Substance Use Screen Level (N/A, LOW, HIGH) no risk    MRSA Screening-   Does the pt have a hx of MRSA? no    Immunizations:  Influenza vaccine given this season n/a - reviewed  Discussed Tdap vaccine yes  Discussed COVID Vaccine yes    Genetic/MFM-  Do you or your partner have a history of any of the following in yourselves or first degree relatives?  Cystic fibrosis no  Spinal muscular atrophy no  Hemoglobinopathy/Sickle Cell/Thalassemia no  Fragile X  Intellectual Disability no    Reviewed, ordered and patient aware of prior auth/scheduling process.      Appointment for Nuchal Translucency Ultrasound at [facility] scheduled for - referral placed at this time. Patient verbalized understanding of scheduling.      Interview education  [facility] Pregnancy Essentials Book reviewed, discussed and attached to their AVS yes      Prenatal lab work scripts yes  Extra labs ordered:  CF, SMA, hgb fractionation cascade, 1hr glucose, varicella    Aspirin/Preeclampsia Screen    Risk Level Risk Factor Recommendation   LOW Prior Uncomplicated full-term delivery YES No Aspirin recommendation        MODERATE Nulliparity no Recommend low-dose aspirin if     BMI>30 YES 2 or more moderate risk factors    Family History Preeclampsia (mother/sister) no     35yr old or greater no      or Low Socioeconomic no     IVF Pregnancy  no     Personal History Risks (low birth weight, prior adverse preg outcome, >10yr preg interval) YES - h/o sab 2015, >10 yr preg interval         HIGH History of Preeclampsia no Recommend low-dose aspirin if     Multifetal gestation no 1 or more high risk factors    Chronic HTN no     Type 1 or 2 Diabetes no     Renal Disease no     Autoimmune Disease  no      Contraindications to ASA therapy:  NSAID/ ASA allergy: no  Nasal polyps: no  Asthma with history of ASA induced bronchospasm: no  Relative contraindications:  History of GI bleed: no  Active peptic ulcer disease: no  Severe hepatic dysfunction: no    Patient should be recommended to take ASA 162mg during this pregnancy from 12-36wks to lower her risk of preeclampsia: reviewed and patient verbalized understanding of risk factors and recommendations. Will further review with [facility] and notify office if she prefers prescription.      The patient has a history now or in prior pregnancy notable for:  none      PN1 visit scheduled. The patient was oriented to our practice, the navigator role, reviewed  delivering physicians and [facility] for Delivery. All questions were answered.    Interviewed by: SLOAN Back RN

## 2024-05-14 ENCOUNTER — TELEPHONE (OUTPATIENT)
Facility: HOSPITAL | Age: 29
End: 2024-05-14

## 2024-05-14 NOTE — TELEPHONE ENCOUNTER
Called patient to schedule MFM appointment, based on referral issued to Maternal Fetal Medicine by OB office.    Left voicemail requesting patient to call back and schedule appointment, with office number for return call [phone].

## 2024-05-17 ENCOUNTER — TELEPHONE (OUTPATIENT)
Facility: HOSPITAL | Age: 29
End: 2024-05-17

## 2024-05-17 NOTE — TELEPHONE ENCOUNTER
Called patient to schedule MFM appointment, based on referral issued to Maternal Fetal Medicine by OB office.    Our office has tried multiple times to schedule an appointment for this patient as indicated in referral. Our office has tried to contact this patient on 5/10, 5/14 and 5/17 but the patient has not returned any phone calls to MFM office to schedule.

## 2024-05-17 NOTE — TELEPHONE ENCOUNTER
Called patient to schedule MFM appointment, based on referral issued to Maternal Fetal Medicine by OB office.    Left voicemail requesting patient to call back and schedule appointment, with office number for return call [phone].     58.9

## 2024-05-20 ENCOUNTER — TELEPHONE (OUTPATIENT)
Age: 29
End: 2024-05-20

## 2024-05-20 NOTE — TELEPHONE ENCOUNTER
Patient called and was scheduled for a dating and viability us on 5/22 and I could not find any other dates until later in June and her lmp 5/22/2024.  Please call patient back to schedule at [phone] . Thank you

## 2024-05-21 NOTE — TELEPHONE ENCOUNTER
Tried to call pt and advised to call after 3pm as she was at work.  Looks like she needs a part 2 ob visit with exam,

## 2024-06-07 ENCOUNTER — TELEPHONE (OUTPATIENT)
Dept: LABOR AND DELIVERY | Facility: HOSPITAL | Age: 29
End: 2024-06-07

## 2024-06-07 ENCOUNTER — TELEPHONE (OUTPATIENT)
Age: 29
End: 2024-06-07

## 2024-06-07 ENCOUNTER — APPOINTMENT (OUTPATIENT)
Dept: LAB | Facility: HOSPITAL | Age: 29
End: 2024-06-07
Attending: OBSTETRICS & GYNECOLOGY
Payer: COMMERCIAL

## 2024-06-07 DIAGNOSIS — Z34.81 ENCOUNTER FOR SUPERVISION OF OTHER NORMAL PREGNANCY, FIRST TRIMESTER: ICD-10-CM

## 2024-06-07 DIAGNOSIS — Z34.91 PRENATAL CARE IN FIRST TRIMESTER: Primary | ICD-10-CM

## 2024-06-07 DIAGNOSIS — Z31.430 ENCOUNTER OF FEMALE FOR TESTING FOR GENETIC DISEASE CARRIER STATUS FOR PROCREATIVE MANAGEMENT: ICD-10-CM

## 2024-06-07 LAB
BACTERIA UR QL AUTO: ABNORMAL /HPF
BASOPHILS # BLD AUTO: 0.03 THOUSANDS/ÂΜL (ref 0–0.1)
BASOPHILS NFR BLD AUTO: 0 % (ref 0–1)
BILIRUB UR QL STRIP: NEGATIVE
CLARITY UR: CLEAR
COLOR UR: YELLOW
EOSINOPHIL # BLD AUTO: 0.04 THOUSAND/ÂΜL (ref 0–0.61)
EOSINOPHIL NFR BLD AUTO: 1 % (ref 0–6)
ERYTHROCYTE [DISTWIDTH] IN BLOOD BY AUTOMATED COUNT: 15.3 % (ref 11.6–15.1)
GLUCOSE UR STRIP-MCNC: NEGATIVE MG/DL
HCT VFR BLD AUTO: 38.6 % (ref 34.8–46.1)
HCV AB SER QL: NORMAL
HGB BLD-MCNC: 12.6 G/DL (ref 11.5–15.4)
HGB UR QL STRIP.AUTO: NEGATIVE
IMM GRANULOCYTES # BLD AUTO: 0.04 THOUSAND/UL (ref 0–0.2)
IMM GRANULOCYTES NFR BLD AUTO: 1 % (ref 0–2)
KETONES UR STRIP-MCNC: NEGATIVE MG/DL
LEUKOCYTE ESTERASE UR QL STRIP: NEGATIVE
LYMPHOCYTES # BLD AUTO: 1.77 THOUSANDS/ÂΜL (ref 0.6–4.47)
LYMPHOCYTES NFR BLD AUTO: 26 % (ref 14–44)
MCH RBC QN AUTO: 25.7 PG (ref 26.8–34.3)
MCHC RBC AUTO-ENTMCNC: 32.6 G/DL (ref 31.4–37.4)
MCV RBC AUTO: 79 FL (ref 82–98)
MONOCYTES # BLD AUTO: 0.44 THOUSAND/ÂΜL (ref 0.17–1.22)
MONOCYTES NFR BLD AUTO: 7 % (ref 4–12)
MUCOUS THREADS UR QL AUTO: ABNORMAL
NEUTROPHILS # BLD AUTO: 4.44 THOUSANDS/ÂΜL (ref 1.85–7.62)
NEUTS SEG NFR BLD AUTO: 65 % (ref 43–75)
NITRITE UR QL STRIP: NEGATIVE
NON-SQ EPI CELLS URNS QL MICRO: ABNORMAL /HPF
NRBC BLD AUTO-RTO: 0 /100 WBCS
PH UR STRIP.AUTO: 6 [PH]
PLATELET # BLD AUTO: 223 THOUSANDS/UL (ref 149–390)
PMV BLD AUTO: 11 FL (ref 8.9–12.7)
PROT UR STRIP-MCNC: ABNORMAL MG/DL
RBC # BLD AUTO: 4.91 MILLION/UL (ref 3.81–5.12)
RBC #/AREA URNS AUTO: ABNORMAL /HPF
RUBV IGG SERPL IA-ACNC: 87.3 IU/ML
SP GR UR STRIP.AUTO: 1.02 (ref 1–1.03)
TREPONEMA PALLIDUM IGG+IGM AB [PRESENCE] IN SERUM OR PLASMA BY IMMUNOASSAY: NORMAL
UROBILINOGEN UR STRIP-ACNC: <2 MG/DL
VZV IGG SER QL IA: NORMAL
WBC # BLD AUTO: 6.76 THOUSAND/UL (ref 4.31–10.16)
WBC #/AREA URNS AUTO: ABNORMAL /HPF

## 2024-06-07 PROCEDURE — 81001 URINALYSIS AUTO W/SCOPE: CPT

## 2024-06-07 PROCEDURE — 86762 RUBELLA ANTIBODY: CPT

## 2024-06-07 PROCEDURE — 87086 URINE CULTURE/COLONY COUNT: CPT

## 2024-06-07 PROCEDURE — 83020 HEMOGLOBIN ELECTROPHORESIS: CPT

## 2024-06-07 PROCEDURE — 36415 COLL VENOUS BLD VENIPUNCTURE: CPT

## 2024-06-07 PROCEDURE — 87389 HIV-1 AG W/HIV-1&-2 AB AG IA: CPT

## 2024-06-07 PROCEDURE — 86787 VARICELLA-ZOSTER ANTIBODY: CPT

## 2024-06-07 PROCEDURE — 86803 HEPATITIS C AB TEST: CPT

## 2024-06-07 PROCEDURE — 86780 TREPONEMA PALLIDUM: CPT

## 2024-06-07 PROCEDURE — 85025 COMPLETE CBC W/AUTO DIFF WBC: CPT

## 2024-06-07 NOTE — TELEPHONE ENCOUNTER
[facility] representative, [place], calling stating that the anemia panel with w/reflex has to be ordered with a CBC w/diff or H&H.  Call back number is [phone] if needed.  Will forward to ordering provider and clinical bin to further address and add in lab work needed.

## 2024-06-08 LAB
BACTERIA UR CULT: NORMAL
HIV 1+2 AB+HIV1 P24 AG SERPL QL IA: NON REACTIVE

## 2024-06-11 LAB
HGB A MFR BLD: 2.1 % (ref 1.8–3.2)
HGB A MFR BLD: 97.9 % (ref 96.4–98.8)
HGB F MFR BLD: 0 % (ref 0–2)
HGB FRACT BLD-IMP: NORMAL
HGB S MFR BLD: 0 %

## 2024-06-14 ENCOUNTER — ROUTINE PRENATAL (OUTPATIENT)
Facility: HOSPITAL | Age: 29
End: 2024-06-14
Attending: OBSTETRICS & GYNECOLOGY
Payer: COMMERCIAL

## 2024-06-14 VITALS
HEIGHT: 61 IN | WEIGHT: 170.8 LBS | SYSTOLIC BLOOD PRESSURE: 120 MMHG | DIASTOLIC BLOOD PRESSURE: 62 MMHG | BODY MASS INDEX: 32.25 KG/M2 | HEART RATE: 80 BPM

## 2024-06-14 DIAGNOSIS — Z3A.12 12 WEEKS GESTATION OF PREGNANCY: ICD-10-CM

## 2024-06-14 DIAGNOSIS — O99.211 SEVERE OBESITY DUE TO EXCESS CALORIES AFFECTING PREGNANCY IN FIRST TRIMESTER (HCC): ICD-10-CM

## 2024-06-14 DIAGNOSIS — Z36.82 NUCHAL TRANSLUCENCY OF FETUS ON PRENATAL ULTRASOUND: ICD-10-CM

## 2024-06-14 DIAGNOSIS — F33.1 MODERATE EPISODE OF RECURRENT MAJOR DEPRESSIVE DISORDER (HCC): Primary | ICD-10-CM

## 2024-06-14 DIAGNOSIS — Z13.79 ENCOUNTER FOR OTHER SCREENING FOR GENETIC AND CHROMOSOMAL ANOMALIES: ICD-10-CM

## 2024-06-14 DIAGNOSIS — E66.01 SEVERE OBESITY DUE TO EXCESS CALORIES AFFECTING PREGNANCY IN FIRST TRIMESTER (HCC): ICD-10-CM

## 2024-06-14 PROCEDURE — 99243 OFF/OP CNSLTJ NEW/EST LOW 30: CPT | Performed by: OBSTETRICS & GYNECOLOGY

## 2024-06-14 PROCEDURE — 76801 OB US < 14 WKS SINGLE FETUS: CPT | Performed by: OBSTETRICS & GYNECOLOGY

## 2024-06-14 PROCEDURE — 76813 OB US NUCHAL MEAS 1 GEST: CPT | Performed by: OBSTETRICS & GYNECOLOGY

## 2024-06-14 NOTE — LETTER
June 21, 2024     Siobhan Cruz MD  [address] [facility] [address]    Patient: Cathy Marks   YOB: 1995   Date of Visit: 6/14/2024       Dear Dr. Cruz:    Thank you for referring Cathy Marks to me for evaluation. Below are my notes for this consultation.    If you have questions, please do not hesitate to call me. I look forward to following your patient along with you.         Sincerely,        Kenan Gaytan MD        CC: No Recipients    Kenan Gaytan MD  6/21/2024 10:48 AM  Sign when Signing Visit  A fetal ultrasound was completed. See Ob procedures in Epic for an interpretation and recommendations. Do not hesitate to contact us in [facility] if you have questions.    Kenan Gaytan MD, MSCE  Maternal Fetal Medicine

## 2024-06-16 LAB — SCAN RESULT: NORMAL

## 2024-06-18 LAB
CFTR FULL MUT ANL BLD/T SEQ: NORMAL
SPECIMEN SOURCE: NORMAL

## 2024-06-19 ENCOUNTER — INITIAL PRENATAL (OUTPATIENT)
Dept: OBGYN CLINIC | Facility: CLINIC | Age: 29
End: 2024-06-19

## 2024-06-19 VITALS — SYSTOLIC BLOOD PRESSURE: 140 MMHG | DIASTOLIC BLOOD PRESSURE: 80 MMHG | BODY MASS INDEX: 32.31 KG/M2 | WEIGHT: 171 LBS

## 2024-06-19 DIAGNOSIS — Z34.82 PRENATAL CARE, SUBSEQUENT PREGNANCY, SECOND TRIMESTER: Primary | ICD-10-CM

## 2024-06-19 DIAGNOSIS — Z36.89 ENCOUNTER FOR OTHER SPECIFIED ANTENATAL SCREENING: ICD-10-CM

## 2024-06-19 PROCEDURE — PNV: Performed by: NURSE PRACTITIONER

## 2024-06-19 NOTE — PROGRESS NOTES
Cathy Marks is a 28 y.o.  at 13w0d.   She is doing well. Denies LOF/Bleeding/Cramping. Denies n/v/Ha, edema. Denies tobacco, drugs or ETOH use. Denies DV.     Visit Vitals  /80   Wt 77.6 kg (171 lb)   LMP 2024 (Exact Date)   BMI 32.31 kg/m²   OB Status Pregnant   Smoking Status Never   BSA 1.77 m²     Pre-Spencer Vitals      Flowsheet Row Most Recent Value   Prenatal Assessment    Fetal Heart Rate 154   Fundal Height (cm) 13 cm   Movement Absent   Prenatal Vitals    Blood Pressure 140/80   Weight - Scale 77.6 kg (171 lb)   Urine Albumin/Glucose    Dilation/Effacement/Station    Vaginal Drainage    Edema           Urine neg/neg  OB physical documented under rooming. Vaginal candida noted on exam. Recommended OTC monistat.     Diagnoses and all orders for this visit:    Prenatal care, subsequent pregnancy, second trimester    Encounter for other specified  screening  -     Alpha fetoprotein, maternal; Future  -     Alpha fetoprotein, maternal        She was seen at [facility] on 24 for NT scan.  No report noted under OB procedure. She states she opted out of NIPS screening. She agreeable to AFP. Order placed.     Pap smear and culture collected.     RTO in 4 weeks for PNV or sooner if needed.

## 2024-06-21 PROBLEM — O99.211 SEVERE OBESITY DUE TO EXCESS CALORIES AFFECTING PREGNANCY IN FIRST TRIMESTER (HCC): Status: ACTIVE | Noted: 2024-06-21

## 2024-06-21 PROBLEM — E66.01 SEVERE OBESITY DUE TO EXCESS CALORIES AFFECTING PREGNANCY IN FIRST TRIMESTER (HCC): Status: ACTIVE | Noted: 2024-06-21

## 2024-06-21 NOTE — PROGRESS NOTES
A fetal ultrasound was completed. See Ob procedures in Epic for an interpretation and recommendations. Do not hesitate to contact us in [facility] if you have questions.    Kenan Gaytan MD, MSCE  Maternal Fetal Medicine

## 2024-06-23 LAB
BACTERIA GENITAL AEROBE CULT: ABNORMAL
C TRACH RRNA CVX QL NAA+PROBE: NEGATIVE
CYTOLOGIST CVX/VAG CYTO: ABNORMAL
DX ICD CODE: ABNORMAL
Lab: ABNORMAL
N GONORRHOEA RRNA CVX QL NAA+PROBE: NEGATIVE
OTHER STN SPEC: ABNORMAL
OTHER STN SPEC: ABNORMAL
PATH REPORT.FINAL DX SPEC: ABNORMAL
SL AMB NOTE:: ABNORMAL
SL AMB SPECIMEN ADEQUACY: ABNORMAL
SL AMB TEST METHODOLOGY: ABNORMAL
T VAGINALIS RRNA SPEC QL NAA+PROBE: POSITIVE

## 2024-06-25 ENCOUNTER — TELEPHONE (OUTPATIENT)
Dept: OBGYN CLINIC | Facility: CLINIC | Age: 29
End: 2024-06-25

## 2024-06-25 DIAGNOSIS — A59.9 TRICHOMONAS INFECTION: Primary | ICD-10-CM

## 2024-06-25 RX ORDER — METRONIDAZOLE 500 MG/1
500 TABLET ORAL EVERY 12 HOURS SCHEDULED
Qty: 14 TABLET | Refills: 0 | Status: SHIPPED | OUTPATIENT
Start: 2024-06-25 | End: 2024-07-02

## 2024-06-25 NOTE — TELEPHONE ENCOUNTER
VM left for patient to return call to review pap smear results. If she returns call please inform her that her pap smear shows she has an STI infection with Trichomonas. I have already sent treatment to her pharmacy, Flagyl 100mg BID x 7 days. Her partner needs to be screened and treated. They should abstain from intimacy until both have been treated. She will require a KEITH at her next OB visit on 7/23/24.

## 2024-07-23 ENCOUNTER — ROUTINE PRENATAL (OUTPATIENT)
Dept: OBGYN CLINIC | Facility: CLINIC | Age: 29
End: 2024-07-23

## 2024-07-23 VITALS
HEART RATE: 73 BPM | BODY MASS INDEX: 32.5 KG/M2 | SYSTOLIC BLOOD PRESSURE: 100 MMHG | OXYGEN SATURATION: 98 % | DIASTOLIC BLOOD PRESSURE: 60 MMHG | WEIGHT: 172 LBS

## 2024-07-23 DIAGNOSIS — Z34.82 PRENATAL CARE, SUBSEQUENT PREGNANCY, SECOND TRIMESTER: Primary | ICD-10-CM

## 2024-07-23 PROCEDURE — PNV: Performed by: NURSE PRACTITIONER

## 2024-07-23 NOTE — PROGRESS NOTES
Cathy Marks is a 28 y.o.  at 17w6d. Has not felt movement yet.     She is doing well. Denies LOF/Bleeding. + Cramping. Denies n/v/Ha, edema. Denies tobacco, drugs or ETOH use. Denies DV.     Visit Vitals  /60   Pulse 73   Wt 78 kg (172 lb)   LMP 2024 (Exact Date)   SpO2 98%   BMI 32.50 kg/m²   OB Status Pregnant   Smoking Status Never   BSA 1.77 m²     Pre- Vitals      Flowsheet Row Most Recent Value   Prenatal Assessment    Fetal Heart Rate 151   Fundal Height (cm) 18 cm   Movement Absent   Prenatal Vitals    Blood Pressure 100/60   Weight - Scale 78 kg (172 lb)   Urine Albumin/Glucose    Dilation/Effacement/Station    Vaginal Drainage    Edema           Urine neg/neg    Diagnoses and all orders for this visit:    Prenatal care, subsequent pregnancy, second trimester      Anatomy scan scheduled for 24.  Reminded to get her AFP drawn.  Just finished treatment for Trichomonas. Will need KEITH at next visit.     RTO in 4 weeks for PNV or sooner if needed.

## 2024-08-06 ENCOUNTER — TELEPHONE (OUTPATIENT)
Dept: PERINATAL CARE | Facility: OTHER | Age: 29
End: 2024-08-06

## 2024-08-06 ENCOUNTER — TELEPHONE (OUTPATIENT)
Age: 29
End: 2024-08-06

## 2024-08-06 NOTE — TELEPHONE ENCOUNTER
Called and lvm regarding message about rescheduling 8/9 MFM appt. Offered pt 8/12 detailed ultrasound available at 12:45 at [place]. Requested pt call back to reschedule.

## 2024-08-06 NOTE — TELEPHONE ENCOUNTER
Pt called in to reschedule her detailed US, nothing avail till 08/20. Please kindly follow up because pt stated that her car will be in the shop.

## 2024-08-09 ENCOUNTER — ROUTINE PRENATAL (OUTPATIENT)
Facility: HOSPITAL | Age: 29
End: 2024-08-09
Payer: COMMERCIAL

## 2024-08-09 VITALS
DIASTOLIC BLOOD PRESSURE: 62 MMHG | BODY MASS INDEX: 33.08 KG/M2 | WEIGHT: 175.2 LBS | HEART RATE: 76 BPM | HEIGHT: 61 IN | SYSTOLIC BLOOD PRESSURE: 110 MMHG

## 2024-08-09 DIAGNOSIS — O34.219 PREGNANCY WITH HISTORY OF CESAREAN SECTION, ANTEPARTUM: ICD-10-CM

## 2024-08-09 DIAGNOSIS — Z3A.20 20 WEEKS GESTATION OF PREGNANCY: ICD-10-CM

## 2024-08-09 DIAGNOSIS — Z36.86 ENCOUNTER FOR ANTENATAL SCREENING FOR CERVICAL LENGTH: ICD-10-CM

## 2024-08-09 DIAGNOSIS — Z36.3 ENCOUNTER FOR ANTENATAL SCREENING FOR MALFORMATIONS: Primary | ICD-10-CM

## 2024-08-09 PROBLEM — O99.212 OBESITY COMPLICATING PREGNANCY IN SECOND TRIMESTER: Status: ACTIVE | Noted: 2024-06-21

## 2024-08-09 PROCEDURE — 76811 OB US DETAILED SNGL FETUS: CPT | Performed by: OBSTETRICS & GYNECOLOGY

## 2024-08-09 PROCEDURE — 76817 TRANSVAGINAL US OBSTETRIC: CPT | Performed by: OBSTETRICS & GYNECOLOGY

## 2024-08-09 NOTE — PROGRESS NOTES
This patient received  care under my supervision on 24 at 20w2d gestational age at [facility].  The note is contained in the ultrasound report located under OB Procedures tab in Epic.  Please call our office at [phone] with questions.  -Albertina Robbins MD

## 2024-08-09 NOTE — PROGRESS NOTES
Ultrasound Probe Disinfection    A transvaginal ultrasound was performed.   Prior to use, disinfection was performed with High Level Disinfection Process ([employer]).  Probe serial number [id number] was used.    Latonia Mckeon  08/09/24  10:38 AM

## 2024-08-12 ENCOUNTER — TELEPHONE (OUTPATIENT)
Facility: HOSPITAL | Age: 29
End: 2024-08-12

## 2024-08-16 ENCOUNTER — APPOINTMENT (OUTPATIENT)
Dept: LAB | Facility: HOSPITAL | Age: 29
End: 2024-08-16
Payer: COMMERCIAL

## 2024-08-16 DIAGNOSIS — Z36.89 ENCOUNTER FOR OTHER SPECIFIED ANTENATAL SCREENING: ICD-10-CM

## 2024-08-16 DIAGNOSIS — Z36.89 SCREENING FOR PREGNANCY-ASSOCIATED PLASMA PROTEIN A: Primary | ICD-10-CM

## 2024-08-16 PROCEDURE — 36415 COLL VENOUS BLD VENIPUNCTURE: CPT

## 2024-08-16 PROCEDURE — 82105 ALPHA-FETOPROTEIN SERUM: CPT

## 2024-08-18 LAB
2ND TRIMESTER 4 SCREEN SERPL-IMP: NORMAL
AFP ADJ MOM SERPL: 1.36
AFP INTERP AMN-IMP: NORMAL
AFP INTERP SERPL-IMP: NORMAL
AFP INTERP SERPL-IMP: NORMAL
AFP SERPL-MCNC: 70.8 NG/ML
AGE AT DELIVERY: 29.2 YR
GA METHOD: NORMAL
GA: 19.9 WEEKS
IDDM PATIENT QL: NO
MULTIPLE PREGNANCY: NO
NEURAL TUBE DEFECT RISK FETUS: 4034 %

## 2024-08-20 ENCOUNTER — ROUTINE PRENATAL (OUTPATIENT)
Dept: OBGYN CLINIC | Facility: CLINIC | Age: 29
End: 2024-08-20

## 2024-08-20 VITALS
DIASTOLIC BLOOD PRESSURE: 60 MMHG | WEIGHT: 178 LBS | HEIGHT: 61 IN | SYSTOLIC BLOOD PRESSURE: 106 MMHG | BODY MASS INDEX: 33.61 KG/M2

## 2024-08-20 DIAGNOSIS — Z20.2 TRICHOMONAS CONTACT, TREATED: Primary | ICD-10-CM

## 2024-08-20 DIAGNOSIS — Z34.82 PRENATAL CARE, SUBSEQUENT PREGNANCY, SECOND TRIMESTER: ICD-10-CM

## 2024-08-20 PROCEDURE — PNV: Performed by: NURSE PRACTITIONER

## 2024-08-20 NOTE — PROGRESS NOTES
"Cathy Marks is a 28 y.o.  at 21w6d. Reports +FM  She is doing well. Denies LOF/Bleeding/Cramping. Denies n/v/Ha, edema. Denies tobacco, drugs or ETOH use. Denies DV.     Anatomy scan completed on 24. Follow-up scan for growth, missed anatomy and re-evaluation of placenta.    Visit Vitals  /60 (BP Location: Right arm, Cuff Size: Standard)   Ht 5' 1\" (1.549 m)   Wt 80.7 kg (178 lb)   LMP 2024 (Exact Date)   BMI 33.63 kg/m²   OB Status Pregnant   Smoking Status Never   BSA 1.8 m²     Pre- Vitals      Flowsheet Row Most Recent Value   Prenatal Assessment    Fetal Heart Rate 150   Fundal Height (cm) 22 cm   Movement Present   Prenatal Vitals    Blood Pressure 106/60   Weight - Scale 80.7 kg (178 lb)   Urine Albumin/Glucose    Dilation/Effacement/Station    Vaginal Drainage    Edema           Urine Tr protein/neg glucose    Diagnoses and all orders for this visit:    Trichomonas contact, treated  -     Trichomonas Vaginalis, DANIA    Prenatal care, subsequent pregnancy, second trimester  -     Trichomonas Vaginalis, DANIA      KEITH culture collected for Trich.     Results will be released to [employer], if abnormal will call to review and discuss treatment plan.     RTO in 4 weeks for PNV or sooner if needed.    "

## 2024-08-21 LAB — T VAGINALIS RRNA SPEC QL NAA+PROBE: NEGATIVE

## 2024-08-25 ENCOUNTER — HOSPITAL ENCOUNTER (EMERGENCY)
Facility: HOSPITAL | Age: 29
Discharge: HOME | End: 2024-08-25
Attending: STUDENT IN AN ORGANIZED HEALTH CARE EDUCATION/TRAINING PROGRAM
Payer: COMMERCIAL

## 2024-08-25 VITALS
SYSTOLIC BLOOD PRESSURE: 116 MMHG | HEART RATE: 82 BPM | TEMPERATURE: 97.9 F | DIASTOLIC BLOOD PRESSURE: 71 MMHG | RESPIRATION RATE: 18 BRPM | OXYGEN SATURATION: 98 %

## 2024-08-25 DIAGNOSIS — N39.0 UTI (URINARY TRACT INFECTION): Primary | ICD-10-CM

## 2024-08-25 LAB
BACTERIA UR QL AUTO: ABNORMAL /HPF
BILIRUB UR QL STRIP: NEGATIVE
CLARITY UR: CLEAR
COLOR UR: ABNORMAL
GLUCOSE UR STRIP-MCNC: NEGATIVE MG/DL
HGB UR QL STRIP.AUTO: ABNORMAL
KETONES UR STRIP-MCNC: NEGATIVE MG/DL
LEUKOCYTE ESTERASE UR QL STRIP: ABNORMAL
NITRITE UR QL STRIP: NEGATIVE
NON-SQ EPI CELLS URNS QL MICRO: ABNORMAL /HPF
PH UR STRIP.AUTO: 7.5 [PH]
PROT UR STRIP-MCNC: ABNORMAL MG/DL
RBC #/AREA URNS AUTO: ABNORMAL /HPF
SP GR UR STRIP.AUTO: 1.02 (ref 1–1.03)
UROBILINOGEN UR STRIP-ACNC: <2 MG/DL
WBC #/AREA URNS AUTO: ABNORMAL /HPF

## 2024-08-25 PROCEDURE — 81001 URINALYSIS AUTO W/SCOPE: CPT | Performed by: PHYSICIAN ASSISTANT

## 2024-08-25 PROCEDURE — 99284 EMERGENCY DEPT VISIT MOD MDM: CPT | Performed by: PHYSICIAN ASSISTANT

## 2024-08-25 PROCEDURE — 99283 EMERGENCY DEPT VISIT LOW MDM: CPT

## 2024-08-25 PROCEDURE — 87086 URINE CULTURE/COLONY COUNT: CPT | Performed by: PHYSICIAN ASSISTANT

## 2024-08-25 RX ORDER — NITROFURANTOIN 25; 75 MG/1; MG/1
100 CAPSULE ORAL 2 TIMES DAILY
Qty: 10 CAPSULE | Refills: 0 | Status: SHIPPED | OUTPATIENT
Start: 2024-08-25 | End: 2024-08-27

## 2024-08-25 NOTE — ED PROVIDER NOTES
History  Chief Complaint   Patient presents with    Blood in Urine     Pt five mos pregnant noticed blood in toilet this am. No cramping or any abd pain and denies any urinary symptoms     29 y/o female  currently 22 weeks pregnant, presenting with large amount of bloody urine that she first noticed this morning. Does not believe it is coming from the vaginal region. Relays she has had UTIs before in the past during her pregnancy with large bloody urine, however, moreseo on this occasion.  She is otherwise had a normal week without any other symptoms, has not had any abdominal pain or cramping, no vaginal bleeding or discharge. Has not gotten any blood on underwear.  Had a recent OB/GYN visit 5 days ago. Denies fevers, N/V, flank pain, abdominal pain, etc. Differential includes but is not limited to PROM, vaginal bleeding, UTI, etc.         Prior to Admission Medications   Prescriptions Last Dose Informant Patient Reported? Taking?   Prenatal Vit-Fe Fumarate-FA (Prenatal Vitamin) 27-0.8 MG TABS   Yes No   Sig: Take 1 capsule by mouth in the morning      Facility-Administered Medications: None       Past Medical History:   Diagnosis Date    Acute pain of right knee 2023    Asthma     childhood    Depression     Miscarriage     Obesity (BMI 30-39.9)        Past Surgical History:   Procedure Laterality Date     SECTION      MOUTH SURGERY N/A        Family History   Problem Relation Age of Onset    Asthma Mother     Other Mother     Diabetes Mother     Deep vein thrombosis Mother     Heart attack Mother     Migraines Mother     Seizures Mother     No Known Problems Father     No Known Problems Brother     Heart disease Maternal Grandmother     Other Maternal Grandmother      I have reviewed and agree with the history as documented.    E-Cigarette/Vaping    E-Cigarette Use Never User      E-Cigarette/Vaping Substances    Nicotine No     THC No     CBD No     Flavoring No     Other No     Unknown No       Social History     Tobacco Use    Smoking status: Never     Passive exposure: Past    Smokeless tobacco: Never   Vaping Use    Vaping status: Never Used   Substance Use Topics    Alcohol use: Not Currently     Comment: rarely, every few months     Drug use: No       Review of Systems   Constitutional: Negative.  Negative for chills, fatigue and fever.   HENT: Negative.  Negative for congestion, postnasal drip, rhinorrhea and sore throat.    Eyes: Negative.    Respiratory: Negative.  Negative for cough, shortness of breath and wheezing.    Cardiovascular: Negative.    Gastrointestinal: Negative.  Negative for abdominal pain, diarrhea, nausea and vomiting.   Endocrine: Negative.    Genitourinary:  Positive for hematuria. Negative for decreased urine volume, difficulty urinating, dyspareunia, dysuria, enuresis, flank pain, frequency, genital sores, menstrual problem, pelvic pain, urgency, vaginal bleeding, vaginal discharge and vaginal pain.   Musculoskeletal: Negative.    Skin: Negative.    Neurological: Negative.    Hematological: Negative.    Psychiatric/Behavioral: Negative.     All other systems reviewed and are negative.      Physical Exam  Physical Exam  Vitals and nursing note reviewed.   Constitutional:       General: She is not in acute distress.     Appearance: Normal appearance. She is well-developed and normal weight. She is not diaphoretic.      Comments: Fetal heart rate is 148. Patient relays baby is moving    HENT:      Head: Normocephalic and atraumatic.      Right Ear: External ear normal.      Left Ear: External ear normal.      Nose: Nose normal.      Mouth/Throat:      Pharynx: No oropharyngeal exudate.   Eyes:      General: No scleral icterus.        Right eye: No discharge.         Left eye: No discharge.      Conjunctiva/sclera: Conjunctivae normal.      Pupils: Pupils are equal, round, and reactive to light.   Cardiovascular:      Rate and Rhythm: Normal rate and regular rhythm.       Pulses: Normal pulses.      Heart sounds: Normal heart sounds. No murmur heard.     No friction rub. No gallop.   Pulmonary:      Effort: Pulmonary effort is normal. No respiratory distress.      Breath sounds: Normal breath sounds. No stridor. No wheezing, rhonchi or rales.   Chest:      Chest wall: No tenderness.   Abdominal:      General: Bowel sounds are normal. There is no distension.      Palpations: Abdomen is soft. There is no mass.      Tenderness: There is no abdominal tenderness. There is no right CVA tenderness, left CVA tenderness, guarding or rebound.      Hernia: No hernia is present.       Genitourinary:      Musculoskeletal:         General: No swelling or tenderness.      Cervical back: Normal range of motion and neck supple.      Right lower leg: No edema.      Left lower leg: No edema.   Lymphadenopathy:      Cervical: No cervical adenopathy.   Skin:     General: Skin is warm and dry.      Capillary Refill: Capillary refill takes less than 2 seconds.      Coloration: Skin is not pale.      Findings: No erythema or rash.   Neurological:      General: No focal deficit present.      Mental Status: She is alert and oriented to person, place, and time. Mental status is at baseline.   Psychiatric:         Mood and Affect: Mood normal.         Thought Content: Thought content normal.         Judgment: Judgment normal.         Vital Signs  ED Triage Vitals [08/25/24 1109]   Temperature Pulse Respirations Blood Pressure SpO2   97.9 °F (36.6 °C) 82 18 116/71 98 %      Temp Source Heart Rate Source Patient Position - Orthostatic VS BP Location FiO2 (%)   Temporal Monitor Sitting Right arm --      Pain Score       No Pain           Vitals:    08/25/24 1109   BP: 116/71   Pulse: 82   Patient Position - Orthostatic VS: Sitting         Visual Acuity      ED Medications  Medications - No data to display    Diagnostic Studies  Results Reviewed       Procedure Component Value Units Date/Time    Urine Microscopic  [[id number]]  (Abnormal) Collected: 08/25/24 1151    Lab Status: Final result Specimen: Urine, Clean Catch Updated: 08/25/24 1221     RBC, UA Innumerable /hpf      WBC, UA       Field obscured, unable to enumerate     /hpf     Epithelial Cells       Field obscured, unable to enumerate     /hpf     Bacteria, UA       Field obscured, unable to enumerate     /hpf     URINE COMMENT --    UA w Reflex to Microscopic w Reflex to Culture [[id number]]  (Abnormal) Collected: 08/25/24 1151    Lab Status: Final result Specimen: Urine, Clean Catch Updated: 08/25/24 1211     Color, UA Dark Red     Clarity, UA Clear     Specific Gravity, UA 1.020     pH, UA 7.5     Leukocytes, UA Small     Nitrite, UA Negative     Protein,  (2+) mg/dl      Glucose, UA Negative mg/dl      Ketones, UA Negative mg/dl      Urobilinogen, UA <2.0 mg/dl      Bilirubin, UA Negative     Occult Blood, UA Large     URINE COMMENT --    Urine culture [[id number]] Collected: 08/25/24 1151    Lab Status: In process Specimen: Urine, Clean Catch Updated: 08/25/24 1211                   No orders to display              Procedures  Procedures         ED Course  ED Course as of 08/25/24 1252   Sun Aug 25, 2024   1143                                                Medical Decision Making  . No abdominal pain or signs of vaginal bleeding. Punch colored urine noted.  Patient has leukocytes in the urine, she did have a growth a few months ago in her urine.  She denies any concerns for STDs, no vaginal discharge and patient reports she was recently checked.  Patient appears well in no distress.  Patient is allergic to cephalosporins.  Will begin Macrobid.  Patient relays that this medication worked well before.     Patient is informed to return to an emergency department for worsening of symptoms such as fevers, pain etc  and was given proper education regarding their diagnosis and symptoms. Otherwise the patient is informed to follow up with OBGYN for  re-evaluation. The patient verbalizes understanding and agrees with above assessment and plan. All questions were answered.        Amount and/or Complexity of Data Reviewed  Labs: ordered.    Risk  Prescription drug management.                 Disposition  Final diagnoses:   UTI (urinary tract infection)     Time reflects when diagnosis was documented in both MDM as applicable and the Disposition within this note       Time User Action Codes Description Comment    8/25/2024 12:46 PM Faviola Babcock Add [N39.0] UTI (urinary tract infection)           ED Disposition       ED Disposition   Discharge    Condition   Stable    Date/Time   Sun Aug 25, 2024 12:46 PM    Comment   Cathy Marks discharge to home/self care.                   Follow-up Information       Follow up With Specialties Details Why Contact Info Additional Information    [facility] Emergency Department Emergency Medicine Go to  If symptoms worsen such as pain, fevers, etc, otherwise please follow up with your OBGYN [address] [place] [id number] [phone] [facility] Emergency Department, [address] [place] [address], [id number]            Patient's Medications   Discharge Prescriptions    NITROFURANTOIN (MACROBID) 100 MG CAPSULE    Take 1 capsule (100 mg total) by mouth 2 (two) times a day       Start Date: 8/25/2024 End Date: --       Order Dose: 100 mg       Quantity: 10 capsule    Refills: 0       No discharge procedures on file.    PDMP Review       None            ED Provider  Electronically Signed by             Faviola Babcock PA-C  08/25/24 2182

## 2024-08-26 ENCOUNTER — HOSPITAL ENCOUNTER (INPATIENT)
Facility: HOSPITAL | Age: 29
LOS: 1 days | Discharge: HOME | DRG: 560 | End: 2024-08-27
Attending: STUDENT IN AN ORGANIZED HEALTH CARE EDUCATION/TRAINING PROGRAM | Admitting: STUDENT IN AN ORGANIZED HEALTH CARE EDUCATION/TRAINING PROGRAM
Payer: COMMERCIAL

## 2024-08-26 PROBLEM — Z3A.22 22 WEEKS GESTATION OF PREGNANCY: Status: ACTIVE | Noted: 2024-08-26

## 2024-08-26 PROBLEM — O34.219 PRETERM DELIVERY AFTER CESAREAN SECTION: Status: ACTIVE | Noted: 2024-08-26

## 2024-08-26 PROBLEM — O34.30 CERVICAL INSUFFICIENCY IN PREGNANCY, ANTEPARTUM: Status: ACTIVE | Noted: 2024-08-26

## 2024-08-26 LAB
ABO GROUP BLD: NORMAL
APTT PPP: 30 SECONDS (ref 23–34)
BACTERIA UR CULT: NORMAL
BLD GP AB SCN SERPL QL: NEGATIVE
ERYTHROCYTE [DISTWIDTH] IN BLOOD BY AUTOMATED COUNT: 16.9 % (ref 11.6–15.1)
FIBRINOGEN PPP-MCNC: 466 MG/DL (ref 206–523)
HCT VFR BLD AUTO: 32 % (ref 34.8–46.1)
HGB BLD-MCNC: 10.7 G/DL (ref 11.5–15.4)
HOLD SPECIMEN: NORMAL
INR PPP: 1.07 (ref 0.85–1.19)
MCH RBC QN AUTO: 27.7 PG (ref 26.8–34.3)
MCHC RBC AUTO-ENTMCNC: 33.4 G/DL (ref 31.4–37.4)
MCV RBC AUTO: 83 FL (ref 82–98)
PLATELET # BLD AUTO: 171 THOUSANDS/UL (ref 149–390)
PMV BLD AUTO: 10.6 FL (ref 8.9–12.7)
PROTHROMBIN TIME: 14.6 SECONDS (ref 12.3–15)
RBC # BLD AUTO: 3.86 MILLION/UL (ref 3.81–5.12)
RH BLD: POSITIVE
SPECIMEN EXPIRATION DATE: NORMAL
WBC # BLD AUTO: 10.44 THOUSAND/UL (ref 4.31–10.16)

## 2024-08-26 PROCEDURE — 86780 TREPONEMA PALLIDUM: CPT

## 2024-08-26 PROCEDURE — 85027 COMPLETE CBC AUTOMATED: CPT

## 2024-08-26 PROCEDURE — 85610 PROTHROMBIN TIME: CPT

## 2024-08-26 PROCEDURE — 88305 TISSUE EXAM BY PATHOLOGIST: CPT | Performed by: PATHOLOGY

## 2024-08-26 PROCEDURE — 4A1HXCZ MONITORING OF PRODUCTS OF CONCEPTION, CARDIAC RATE, EXTERNAL APPROACH: ICD-10-PCS | Performed by: STUDENT IN AN ORGANIZED HEALTH CARE EDUCATION/TRAINING PROGRAM

## 2024-08-26 PROCEDURE — 86901 BLOOD TYPING SEROLOGIC RH(D): CPT

## 2024-08-26 PROCEDURE — 86850 RBC ANTIBODY SCREEN: CPT

## 2024-08-26 PROCEDURE — 85730 THROMBOPLASTIN TIME PARTIAL: CPT

## 2024-08-26 PROCEDURE — NC001 PR NO CHARGE: Performed by: STUDENT IN AN ORGANIZED HEALTH CARE EDUCATION/TRAINING PROGRAM

## 2024-08-26 PROCEDURE — 86900 BLOOD TYPING SEROLOGIC ABO: CPT

## 2024-08-26 PROCEDURE — 85384 FIBRINOGEN ACTIVITY: CPT

## 2024-08-26 PROCEDURE — 85460 HEMOGLOBIN FETAL: CPT

## 2024-08-26 RX ORDER — NITROFURANTOIN 25; 75 MG/1; MG/1
100 CAPSULE ORAL 2 TIMES DAILY
Status: DISCONTINUED | OUTPATIENT
Start: 2024-08-27 | End: 2024-08-26

## 2024-08-26 RX ORDER — BETAMETHASONE SODIUM PHOSPHATE AND BETAMETHASONE ACETATE 3; 3 MG/ML; MG/ML
12 INJECTION, SUSPENSION INTRA-ARTICULAR; INTRALESIONAL; INTRAMUSCULAR; SOFT TISSUE EVERY 24 HOURS
Status: DISCONTINUED | OUTPATIENT
Start: 2024-08-26 | End: 2024-08-26

## 2024-08-26 RX ORDER — OXYTOCIN/RINGER'S LACTATE 30/500 ML
PLASTIC BAG, INJECTION (ML) INTRAVENOUS
Status: COMPLETED
Start: 2024-08-26 | End: 2024-08-26

## 2024-08-26 RX ORDER — MAGNESIUM SULFATE HEPTAHYDRATE 40 MG/ML
4 INJECTION, SOLUTION INTRAVENOUS ONCE
Status: COMPLETED | OUTPATIENT
Start: 2024-08-26 | End: 2024-08-26

## 2024-08-26 RX ORDER — CARBOPROST TROMETHAMINE 250 UG/ML
INJECTION, SOLUTION INTRAMUSCULAR
Status: DISPENSED
Start: 2024-08-26 | End: 2024-08-27

## 2024-08-26 RX ORDER — HYDROMORPHONE HCL/PF 1 MG/ML
1 SYRINGE (ML) INJECTION ONCE
Status: COMPLETED | OUTPATIENT
Start: 2024-08-26 | End: 2024-08-26

## 2024-08-26 RX ORDER — METHYLERGONOVINE MALEATE 0.2 MG/ML
INJECTION INTRAVENOUS
Status: COMPLETED
Start: 2024-08-26 | End: 2024-08-26

## 2024-08-26 RX ORDER — ACETAMINOPHEN 10 MG/ML
1000 INJECTION, SOLUTION INTRAVENOUS ONCE
Status: DISCONTINUED | OUTPATIENT
Start: 2024-08-26 | End: 2024-08-26

## 2024-08-26 RX ORDER — SODIUM CHLORIDE, SODIUM LACTATE, POTASSIUM CHLORIDE, CALCIUM CHLORIDE 600; 310; 30; 20 MG/100ML; MG/100ML; MG/100ML; MG/100ML
125 INJECTION, SOLUTION INTRAVENOUS CONTINUOUS
Status: DISCONTINUED | OUTPATIENT
Start: 2024-08-26 | End: 2024-08-26

## 2024-08-26 RX ADMIN — SODIUM CHLORIDE, SODIUM LACTATE, POTASSIUM CHLORIDE, AND CALCIUM CHLORIDE 125 ML/HR: .6; .31; .03; .02 INJECTION, SOLUTION INTRAVENOUS at 23:15

## 2024-08-26 RX ADMIN — SODIUM CHLORIDE, SODIUM LACTATE, POTASSIUM CHLORIDE, AND CALCIUM CHLORIDE 999 ML/HR: .6; .31; .03; .02 INJECTION, SOLUTION INTRAVENOUS at 21:15

## 2024-08-26 RX ADMIN — HYDROMORPHONE HYDROCHLORIDE 1 MG: 1 INJECTION, SOLUTION INTRAMUSCULAR; INTRAVENOUS; SUBCUTANEOUS at 21:49

## 2024-08-26 RX ADMIN — OXYTOCIN 30 UNITS: 10 INJECTION INTRAVENOUS at 21:41

## 2024-08-26 RX ADMIN — MAGNESIUM SULFATE HEPTAHYDRATE 4 G: 40 INJECTION, SOLUTION INTRAVENOUS at 21:30

## 2024-08-26 RX ADMIN — METHYLERGONOVINE MALEATE 0.2 MG: 0.2 INJECTION INTRAVENOUS at 21:56

## 2024-08-27 VITALS
WEIGHT: 178 LBS | TEMPERATURE: 97.5 F | HEART RATE: 62 BPM | OXYGEN SATURATION: 99 % | RESPIRATION RATE: 16 BRPM | BODY MASS INDEX: 33.61 KG/M2 | SYSTOLIC BLOOD PRESSURE: 102 MMHG | HEIGHT: 61 IN | DIASTOLIC BLOOD PRESSURE: 62 MMHG

## 2024-08-27 PROBLEM — O60.02 PRETERM LABOR IN SECOND TRIMESTER: Status: ACTIVE | Noted: 2024-08-27

## 2024-08-27 LAB
FETAL RBC/1000 RBC BLD KLEIH BETKE-RTO: 0.1 % (ref 0–1)
TREPONEMA PALLIDUM IGG+IGM AB [PRESENCE] IN SERUM OR PLASMA BY IMMUNOASSAY: NORMAL

## 2024-08-27 PROCEDURE — 99024 POSTOP FOLLOW-UP VISIT: CPT | Performed by: OBSTETRICS & GYNECOLOGY

## 2024-08-27 PROCEDURE — 99205 OFFICE O/P NEW HI 60 MIN: CPT

## 2024-08-27 RX ORDER — IBUPROFEN 600 MG/1
600 TABLET, FILM COATED ORAL EVERY 6 HOURS PRN
Start: 2024-08-27

## 2024-08-27 NOTE — ASSESSMENT & PLAN NOTE
Unremarkable prenatal care with the exception of a trich infection in  with a negative KEITH  MFM scan at 20w2d (24): mid placental lake measuring 4.49 x 1.16 x 2.87cm, cervical length 4.03 cm  FHT was appropriate for her GA with moderate variability but then started having recurrent variable decels which increased in frequency  FHR was confirmed on TAUS, fetal position was vertex  Based on exam findings, patient was counseled about our concern for rapidly progressing  labor and the option for STAT classical  delivery for fetal benefit if she desired the full extent of fetal resuscitation efforts  We also explained the alternative would be to allow her labor to proceed with the understanding that this would likely further decrease the likelihood of fetal survival  Due to concern for ongoing PTL and periviable GA  At that time, she did not feel strongly about attempting a CS, NICU was contacted urgently for additional counseling and the patient briefly discussed risks/benefits with the neonatologist as we moved her to a labor room  Magnesium and betamethasone were administered as soon as possible, see delivery note for further documentation

## 2024-08-27 NOTE — QUICK NOTE
Late entry due to patient care responsibilities.     While the patient was in triage with Dr. Obando, I introduced myself to her as the supervising OB/GYN on call in case of emergencies.  Given her active labor at 22w5d, I described her fetus low likelihood of intact survival, but the possibility of resuscitation.  We also discussed STAT classical  delivery for fetal benefit if she felt strongly about full resuscitation.  Patient reports that given low chances of intact survival, she did not feel strongly and would prefer to discuss with neonatologist.  NICU was notified of her clinical situation and presented for further counseling.  She was then transferred to a labor room and BMZ and magnesium sulfate were ordered per discussion with neonatologist.  Warm handoff provided to Dr. Heller upon her arrival.    Ifeoma Dietrich MD

## 2024-08-27 NOTE — L&D DELIVERY NOTE
Vaginal Delivery Summary - OB/GYN   Cathy Marks 28 y.o. female MRN: [id number]  Unit/Bed#:  209-01 Encounter: [id number]          Predelivery Diagnosis:  1. Pregnancy at 22 weeks and 5 days gestation  2.  labor/ cervical insufficiency  3. Concern for placental abruption  4. Prior  delivery    Postdelivery Diagnosis:  1. Same as above  2. Delivery of nonviable fetus    Procedure: Spontaneous Vaginal Delivery     Attending: Denisha Heller MD    Assistant: Coleen Obando MD    Anesthesia: None    EBL: 150cc    Complications: none apparent    Specimens: placenta to pathology    Findings:   1. Delivery of nonviable female fetus at   2. Immediate spontaneous delivery of placenta following fetus with large amount of clot burden in keeping with placental abruption  3. Intact perineum    Brief history and labor course:  Ms. Cathy Marks is a 28 y.o.  at 22 weeks and 5 days who presented to L&D for concerns of vaginal bleeding and abdominal pain. On exam patient was found to be completely dilated with fetal parts palpated and the vagina-patient was counseled by chief resident and in-house physician on her options of classical  delivery given early periviability although grim prognosis of survival and quality of  life thereafter versus is having NICU in the room at time of delivery with attempt to vaginal delivery with attempt of resuscitation. Patient preferred trial of vaginal delivery with resuscitation if possible- NICU was informed and magnesium bolus was given.  On ultrasound fetus was found to be in vertex presentation.On ultrasound fetus was noted to be in vertex presentation with fetal bradycardia noted in triage.     Patient was transferred to [facility] where I presented and saw patient- Cathy began having increasing pressure and vaginal pain and patient began to push.    Description of procedure    After pushing for 1 minutes, at  patient delivered a  nonviable female . The fetal arm delivered followed by shoulder- the fetal head was gently elevated and delivered quickly followed by spontaneous delivery fetal body and placenta at the same time. Fetus was handed off to awaiting NICU team who found no fetal heart rate. The umbilical cord was then cut and the placenta was sent to pathology for evaluation.    The vagina, cervix, perineum, and rectum were inspected and there was noted to be a no lacerations however some brisk red bleeding was noted- pitocin IV was administered following delivery. Patient was given 1mg of dilaudid and a gentle sweep of lower uterine segment had some clot removed and 0.2 mg of Methergine was administered for bleeding and bedside ultrasound showed a thin endometrial stripe.    At the conclusion of the procedure, all needle, sponge, and instrument counts were noted to be correct. Patient tolerated the procedure. Following procedure I gave Cathy my sincerest condolences for this unthinkable loss- I offered her my support and offered to call family which she was able to do to be with her this evening. Following delivery it is my opinion this occurred from placental abruption given findings at time of delivery which I shared with Cathy. We did review her ability for genetic testing and fetal autopsy if desired and I did share that placenta went to pathology for testing. We reviewed hospital versus private disposition. She was given an opportunity to ask questions which understandably she had none at this time.     Denisha Heller  2024  10:40 PM

## 2024-08-27 NOTE — UTILIZATION REVIEW
"Initial Clinical Review    Admission: Date/Time/Statement:   Admission Orders (From admission, onward)       Ordered        24  Inpatient Admission  Once                          Orders Placed This Encounter   Procedures    Inpatient Admission     Standing Status:   Standing     Number of Occurrences:   1     Order Specific Question:   Level of Care     Answer:   Med Surg [16]     Order Specific Question:   Estimated length of stay     Answer:   More than 2 Midnights     Order Specific Question:   Certification     Answer:   I certify that inpatient services are medically necessary for this patient for a duration of greater than two midnights. See H&P and MD Progress Notes for additional information about the patient's course of treatment.     ED Arrival Information       Expected   2024     Arrival   2024 19:08    Acuity   -              Means of arrival   Walk-In    Escorted by   Spouse    Service   OB/GYN    Admission type   Emergency              Arrival complaint   22 Wks Preg / Vaginal Bleeding / Cramping             Chief Complaint   Patient presents with    Vaginal Bleeding     Started bleeding yesterday morning at around 1000 - pt reports \"heavy bleeding\" only while urinating. Pt reports on/off bleeding once she started her medication Nitrofurnatoin 100mg for her UTI. Pt then reports a large clot the size of an apple an hour before she came in today that she had originally thought was her baby.        Initial Presentation: 28 y.o. female  at 22w5d who is being admitted for omero-viable  labor. She was seen in the [facility] yesterday due to hematuria and discharged with antibiotics for a UTI. Today she developed worsening cramping, occurring every 2 minutes and passage of clots. She states there is decreased FM and thinks she \"pushed the baby out into the toilet when she went pee but isn't sure.\" Cervix found to be dilated with fetal parts visualized coming from the cervical " os on speculum exam  Likely from placental abruption based on delivery of placenta with the baby and retroplacental clot. Based on exam findings, patient was counseled about our concern for rapidly progressing  labor and the option for STAT classical  delivery for fetal benefit if she desired the full extent of fetal resuscitation efforts.  At that time, she did not feel strongly about attempting a CS.     Delivery of nonviable female fetus at 2141. Immediate spontaneous delivery of placenta following fetus with large amount of clot burden in keeping with placental abruption.     Date:    Day 2:     OB:  PPD 1 s/p  at 22w5d due to  labor, likely placental abruption with peripartum fetal demise. Likely from placental abruption based on delivery of placenta with the baby and retroplacental clot. Coags wnl, fibrinogen 466, KB in process. Pain control and grief support.     OB Triage Vitals   Temperature Pulse Respirations Blood Pressure SpO2 Pain Score   24   98.4 °F (36.9 °C) 91 18 110/55 94 % 10 - Worst Possible Pain     Weight (last 2 days)       Date/Time Weight    24 0740 80.7 (178)    24 --    Comment rows:    OBSERV: Attending Physician, Dr. Heller at bedside at 24 --    Comment rows:    OBSERV: Patient transferred to labor room with RN and charge nurse. at 24 --    Comment rows:    OBSERV: Dr. Dietrich at bedside with senior resident for SVE at 24 --    Comment rows:    OBSERV: Senior resident at bedside at 24 --    Comment rows:    OBSERV: Evelio resident at bedside for speculum exam at 24            Vital Signs (last 3 days)       Date/Time Temp Pulse Resp BP SpO2 O2 Device Patient Position - Orthostatic VS Pain    24 0741 -- 62 -- 102/62 -- -- -- --     08/27/24 0740 97.5 °F (36.4 °C) 64 16 102/62 99 % None (Room air) Lying No Pain    08/27/24 0600 -- 52 -- 98/53 -- -- -- --    08/27/24 0500 -- 52 -- 107/54 -- -- -- --    08/27/24 0300 -- 56 -- 100/55 -- -- -- --    08/27/24 0245 -- 65 -- 105/57 -- -- -- --    08/27/24 0230 -- 67 -- 102/55 -- -- -- --    08/27/24 0215 -- 57 -- 100/52 -- -- -- --    08/27/24 0200 -- 67 -- 98/55 -- -- -- --    08/27/24 0145 -- 73 -- 103/53 -- -- -- --    08/27/24 0130 -- 63 -- 107/59 -- -- -- --    08/27/24 0115 -- 76 -- 104/58 -- -- -- --    08/27/24 0100 -- 75 -- 97/55 -- -- -- No Pain    08/27/24 0045 -- 74 -- 103/51 -- -- -- --    08/27/24 0030 -- 81 -- 108/58 -- -- -- --    08/27/24 0015 -- 78 -- 115/58 -- -- -- --    08/27/24 0001 -- 71 -- 108/52 -- -- -- No Pain    08/26/24 2345 -- 82 -- 119/77 -- -- -- --    08/26/24 2330 -- 78 -- 110/67 -- -- -- No Pain    08/26/24 2317 -- 79 -- 106/64 -- -- -- No Pain    08/26/24 2302 -- 73 -- 113/68 -- -- -- No Pain    08/26/24 2247 -- -- -- 114/64 -- -- Lying No Pain    08/26/24 2231 -- -- -- 107/65 -- -- Lying No Pain    08/26/24 2215 -- -- -- 111/72 -- -- Lying No Pain    08/26/24 2149 -- -- -- -- -- -- -- 10 - Worst Possible Pain    08/26/24 2120 -- -- -- -- -- -- -- --    OBSERV: Attending Physician, Dr. Heller at bedside at 08/26/24 2120 08/26/24 2114 -- -- -- -- -- -- -- --    OBSERV: Patient transferred to labor room with RN and charge nurse. at 08/26/24 2114 08/26/24 2055 -- -- -- -- -- -- -- --    OBSERV: Dr. Dietrich at bedside with senior resident for SVE at 08/26/24 2055 08/26/24 2052 -- -- -- -- -- -- -- --    OBSERV: Senior resident at bedside at 08/26/24 2052 08/26/24 2046 -- -- -- -- -- -- -- --    OBSERV: Evelio resident at bedside for speculum exam at 08/26/24 2046 08/26/24 2000 98.4 °F (36.9 °C) 91 18 110/55 94 % -- Lying --              Pertinent Labs/Diagnostic Test Results:   Radiology:  No orders to display     Cardiology:  No orders to display      GI:  No orders to display           Results from last 7 days   Lab Units 08/26/24  2134   WBC Thousand/uL 10.44*   HEMOGLOBIN g/dL 10.7*   HEMATOCRIT % 32.0*   PLATELETS Thousands/uL 171           Results from last 7 days   Lab Units 08/26/24  2236   PROTIME seconds 14.6   INR  1.07   PTT seconds 30           Results from last 7 days   Lab Units 08/25/24  1151   CLARITY UA  Clear   COLOR UA  Dark Red   SPEC GRAV UA  1.020   PH UA  7.5   GLUCOSE UA mg/dl Negative   KETONES UA mg/dl Negative   BLOOD UA  Large*   PROTEIN UA mg/dl 100 (2+)*   NITRITE UA  Negative   BILIRUBIN UA  Negative   UROBILINOGEN UA (BE) mg/dl <2.0   LEUKOCYTES UA  Small*   WBC UA /hpf Field obscured, unable to enumerate*   RBC UA /hpf Innumerable*   BACTERIA UA /hpf Field obscured, unable to enumerate*   EPITHELIAL CELLS WET PREP /hpf Field obscured, unable to enumerate*           Results from last 7 days   Lab Units 08/25/24  1151   URINE CULTURE  [id number] cfu/ml         ED Treatment-Medication Administration from 08/26/2024 1908 to 08/26/2024 1925       None            Past Medical History:   Diagnosis Date    Acute pain of right knee 01/16/2023    Asthma     childhood    Depression     Miscarriage     Obesity (BMI 30-39.9)      Present on Admission:  **None**      Admitting Diagnosis: Vaginal bleeding [N93.9]  Fetal demise before 22 weeks with retention of dead fetus [O36.4XX0]  Age/Sex: 28 y.o. female  Admission Orders:  Scheduled Medications:  carboprost, , ,       Continuous IV Infusions:     PRN Meds:  carboprost, , ,         None    Network Utilization Review Department  ATTENTION: Please call with any questions or concerns to [phone] and carefully listen to the prompts so that you are directed to the right person. All voicemails are confidential.   For Discharge needs, contact Care Management DC Support Team at [phone] opt. 2  Send all requests for admission clinical reviews, approved or denied determinations and any other  requests to dedicated fax number below belonging to the campus where the patient is receiving treatment. List of dedicated fax numbers for the Facilities:  FACILITY NAME UR FAX NUMBER   ADMISSION DENIALS (Administrative/Medical Necessity) [phone]   DISCHARGE SUPPORT TEAM (NETWORK) [phone]   PARENT CHILD HEALTH (Maternity/NICU/Pediatrics) [phone]   [facility] [phone]   [facility] [phone]   On license of [facility] [phone]   [facility] [phone]   [facility] [phone]   [facility] [phone]   [facility] [phone]   [facility] [phone]   [facility] [phone]   [facility] [phone]   [facility] [phone]   [facility] [phone]

## 2024-08-27 NOTE — QUICK NOTE
"Patient report bleeding is light, generally comfortable, still slightly teary.  Vitals:    08/27/24 0500 08/27/24 0600 08/27/24 0740 08/27/24 0741   BP: 107/54 98/53 102/62 102/62   BP Location:   Right arm    Pulse: (!) 52 (!) 52 64 62   Resp:   16    Temp:   97.5 °F (36.4 °C)    TempSrc:   Axillary    SpO2:   99%    Weight:   80.7 kg (178 lb)    Height:   5' 1\" (1.549 m)      Fudus firm nontender at u-1  Ext: no edema no calf pain    Reviewed suspected abruption, discussed support and follow up.  Patient reports no further questions.  Will eat breakfast and plan for discharge.  "

## 2024-08-27 NOTE — ASSESSMENT & PLAN NOTE
Likely from placental abruption based on delivery of placenta with the baby and retroplacental clot  Coags wnl, fibrinogen 466, KB in process

## 2024-08-27 NOTE — ASSESSMENT & PLAN NOTE
Likely from placental abruption based on delivery of placenta with the baby and retroplacental clot  Coags wnl, fibrinogen 466, KB in process  Follow up additional labs

## 2024-08-27 NOTE — PLAN OF CARE
Problem: PAIN - ADULT  Goal: Verbalizes/displays adequate comfort level or baseline comfort level  Description: Interventions:  - Encourage patient to monitor pain and request assistance  - Assess pain using appropriate pain scale  - Administer analgesics based on type and severity of pain and evaluate response  - Implement non-pharmacological measures as appropriate and evaluate response  - Consider cultural and social influences on pain and pain management  - Notify physician/advanced practitioner if interventions unsuccessful or patient reports new pain  Outcome: Progressing     Problem: INFECTION - ADULT  Goal: Absence or prevention of progression during hospitalization  Description: INTERVENTIONS:  - Assess and monitor for signs and symptoms of infection  - Monitor lab/diagnostic results  - Monitor all insertion sites, i.e. indwelling lines, tubes, and drains  - Monitor endotracheal if appropriate and nasal secretions for changes in amount and color  - [place] appropriate cooling/warming therapies per order  - Administer medications as ordered  - Instruct and encourage patient and family to use good hand hygiene technique  - Identify and instruct in appropriate isolation precautions for identified infection/condition  Outcome: Progressing  Goal: Absence of fever/infection during neutropenic period  Description: INTERVENTIONS:  - Monitor WBC    Outcome: Progressing     Problem: SAFETY ADULT  Goal: Patient will remain free of falls  Description: INTERVENTIONS:  - Educate patient/family on patient safety including physical limitations  - Instruct patient to call for assistance with activity   - Consult OT/PT to assist with strengthening/mobility   - Keep Call bell within reach  - Keep bed low and locked with side rails adjusted as appropriate  - Keep care items and personal belongings within reach  - Initiate and maintain comfort rounds  - Make Fall Risk Sign visible to staff  - Offer Toileting every  Hours,  in advance of need  - Initiate/Maintain alarm  - Obtain necessary fall risk management equipment:   - Apply yellow socks and bracelet for high fall risk patients  - Consider moving patient to room near nurses station  Outcome: Progressing  Goal: Maintain or return to baseline ADL function  Description: INTERVENTIONS:  -  Assess patient's ability to carry out ADLs; assess patient's baseline for ADL function and identify physical deficits which impact ability to perform ADLs (bathing, care of mouth/teeth, toileting, grooming, dressing, etc.)  - Assess/evaluate cause of self-care deficits   - Assess range of motion  - Assess patient's mobility; develop plan if impaired  - Assess patient's need for assistive devices and provide as appropriate  - Encourage maximum independence but intervene and supervise when necessary  - Involve family in performance of ADLs  - Assess for home care needs following discharge   - Consider OT consult to assist with ADL evaluation and planning for discharge  - Provide patient education as appropriate  Outcome: Progressing  Goal: Maintains/Returns to pre admission functional level  Description: INTERVENTIONS:  - Perform AM-PAC 6 Click Basic Mobility/ Daily Activity assessment daily.  - Set and communicate daily mobility goal to care team and patient/family/caregiver.   - Collaborate with rehabilitation services on mobility goals if consulted  - Perform Range of Motion  times a day.  - Reposition patient every  hours.  - Dangle patient  times a day  - Stand patient  times a day  - Ambulate patient  times a day  - Out of bed to chair  times a day   - Out of bed for meals  times a day  - Out of bed for toileting  - Record patient progress and toleration of activity level   Outcome: Progressing     Problem: Knowledge Deficit  Goal: Patient/family/caregiver demonstrates understanding of disease process, treatment plan, medications, and discharge instructions  Description: Complete learning  assessment and assess knowledge base.  Interventions:  - Provide teaching at level of understanding  - Provide teaching via preferred learning methods  Outcome: Progressing     Problem: DISCHARGE PLANNING  Goal: Discharge to home or other facility with appropriate resources  Description: INTERVENTIONS:  - Identify barriers to discharge w/patient and caregiver  - Arrange for needed discharge resources and transportation as appropriate  - Identify discharge learning needs (meds, wound care, etc.)  - Arrange for interpretive services to assist at discharge as needed  - Refer to Case Management Department for coordinating discharge planning if the patient needs post-hospital services based on physician/advanced practitioner order or complex needs related to functional status, cognitive ability, or social support system  Outcome: Progressing     Problem: COPING  Goal: Pt/Family able to verbalize concerns and demonstrate effective coping strategies  Description: INTERVENTIONS:  - Assist patient/family to identify coping skills, available support systems and cultural and spiritual values  - Provide emotional support, including active listening and acknowledgement of concerns of patient and caregivers  - Reduce environmental stimuli, as able  - Provide patient education  - Assess for spiritual pain/suffering and initiate spiritual care, including notification of Pastoral Care or mir based community as needed  - Assess effectiveness of coping strategies  Outcome: Progressing  Goal: Will report anxiety at manageable levels  Description: INTERVENTIONS:  - Administer medication as ordered  - Teach and encourage coping skills  - Provide emotional support  - Assess patient/family for anxiety and ability to cope  Outcome: Progressing     Problem: DEATH & DYING  Goal: Pt/Family communicate acceptance of impending death and expresses psychological comfort and peace  Description: INTERVENTIONS:  - Assess patient/family anxiety and  grief process related to end of life issues  - Provide emotional, spiritual and psychosocial support  - Provide information about the patient’s health status with consideration of family and cultural values  - Communicate willingness to discuss death and facilitate grief process  with patient/family as appropriate  - Emphasize sustaining relationships within family system and community, or mir/spiritual traditions  - Initiate Spiritual Care, Pastoral care or other ancillary consults as needed  - Refer to community support groups as appropriate  Outcome: Progressing     Problem: CHANGE IN BODY IMAGE  Goal: Pt/Family communicate acceptance of loss or change in body image and expresses psychological comfort and peace  Description: INTERVENTIONS:  - Assess patient/family anxiety and grief process related to change in body image, loss of functional status and loss of sense of self  - Assess patient/family's coping skills and provide emotional, spiritual and psychosocial support  - Provide information about the patient's health status with consideration of family and cultural values  - Communicate willingness to discuss loss and facilitate grief process with patient/family as appropriate  - Emphasize sustaining relationships within family system and community, or mir/spiritual traditions  - Refer to community support groups as appropriate  - Initiate Spiritual Care, Pastoral care or other ancillary consults as needed  Outcome: Progressing     Problem: DECISION MAKING  Goal: Pt/Family able to effectively weigh alternatives and participate in decision making related to treatment and care  Description: INTERVENTIONS:  - Identify decision maker  - Determine when there are differences among patient's view, family's view, and healthcare provider's view of patient condition and care goals  - Facilitate patient/family articulation of goals for care  - Help patient/family identify pros/cons of alternative solutions  - Provide  information as requested by patient/family  - Respect patient/family rights related to privacy and sharing information   - Serve as a liaison between patient, family and health care team  - Initiate consults as appropriate (Ethics Team, Palliative Care, Family Care Conference, etc.)  Outcome: Progressing     Problem: CONFUSION/THOUGHT DISTURBANCE  Goal: Thought disturbances (confusion, delirium, depression, dementia or psychosis) are managed to maintain or return to baseline mental status and functional level  Description: INTERVENTIONS:  - Assess for possible contributors to  thought disturbance, including but not limited to medications, infection, impaired vision or hearing, underlying metabolic abnormalities, dehydration, respiratory compromise,  psychiatric diagnoses and notify attending PHYSICAN/AP  - Monitor and intervene to maintain adequate nutrition, hydration, elimination, sleep and activity  - Decrease environmental stimuli, including noise as appropriate.  - Provide frequent contacts to provide refocusing, direction and reassurance as needed. Approach patient calmly with eye contact and at their level.  - [place] high risk fall precautions, aspiration precautions and other safety measures, as indicated  - If delirium suspected, notify physician/AP of change in condition and request immediate in-person evaluation  - Pursue consults as appropriate including Geriatric (campus dependent), OT for cognitive evaluation/activity planning, psychiatric, pastoral care, etc.  Outcome: Progressing     Problem: BEHAVIOR  Goal: Pt/Family maintain appropriate behavior and adhere to behavioral management agreement, if implemented  Description: INTERVENTIONS:  - Assess the family dynamic   - Encourage verbalization of thoughts and concerns in a socially appropriate manner  - Assess patient/family's coping skills and non-compliant behavior (including use of illegal substances).  - Utilize positive, consistent limit  setting strategies supporting safety of patient, staff and others  - Initiate consult with Case Management, Spiritual Care or other ancillary services as appropriate  - If a patient's/visitor's behavior jeopardizes the safety of the patient, staff, or others, refer to organization procedure.   - Notify Security of behavior or suspected illegal substances which indicate the need for search of the patient and/or belongings  - Encourage participation in the decision making process about a behavioral management agreement; implement if patient meets criteria  Outcome: Progressing     Problem: SELF HARM  Goal: Effect of psychiatric condition will be minimized and patient will be protected from self harm  Description: INTERVENTIONS:  - Assess impact of patient's symptoms on level of functioning, self-care needs and offer support as indicated  - Assess patient/family knowledge of depression, impact on illness and need for teaching  - Provide emotional support, presence and reassurance  - Assess for possible suicidal thoughts, ideation or self-harm. If patient expresses suicidal thoughts or statements do not leave alone, notify physician/AP immediately, initiate suicide precautions, and determine need for continual observation.  - initiate consults and referrals as appropriate (a mental health professional, Spiritual Care  Outcome: Progressing     Problem: ABUSE/NEGLECT  Goal: Pt/Caregiver/Family aware of resources to assist with issues of abuse and neglect  Description: INTERVENTIONS:  - If child abuse and/or neglect is suspected, notify Childline directly  - Assess for level of risk and safety  - Initiate referral to Case Management  - Notify PHYSICIAN/AP and Nursing Supervisor  - Provide appropriate education and resources to patient and/or family  - Initiate referral to age appropriate protective services, i.e. [facility] on [facility] or Child Protective Services  - Offer patient/caregiver the option to Opt Out of patient  information directory  - Provide emotional support, including active listening and acknowledgment of concerns  - Provide the patient with information about supportive services i.e. shelters, community resources for domestic violence  Outcome: Progressing     Problem: SPIRITUAL CARE  Goal: Pt/Family able to move forward in process of forgiving self, others and/or higher power  Description: INTERVENTIONS:  - Assist patient with any spiritual needs/requests such as communion, confession, anointing, etc  - Explore guilt and help patient/family identify possible spiritual/cultural beliefs and values  - Explore possibilities of making amends & reconciliation with self, others, and/or a greater power  - Guide patient/family in identifying painful feelings  - Help patient explore and identify spiritual beliefs, cultural understandings or values that may help or hinder letting go of issue  - Help patient explore feelings of anger, bitterness, resentment, anxiety   Help patient/family identify and examine the situation in which these feelings are experienced  - Help patient/family identify destructive displacement of feelings onto other individuals  - Refer patient to formal counseling and/or to mir community for further support as needed or per request  Outcome: Progressing  Goal: Patient feels balance and connection with others and/or higher power that empowers the self during times of loss, guilt and fear  Description: INTERVENTIONS:  - Create safety for patient through empathic presence and non-judgmental listening  - Encourage patient to explore his/her values, beliefs and/or spiritual images and practices  - Encourage use of breath work, imagery, meditation, relaxation, reiki to ease distress and provide healing  - Encourage use of cultural and spiritual celebrations and rituals  - Facilitate discussion that helps patient sort out spiritual concerns  - Help patient identify where meaning/hope/comfort & strength are in  his/her life  - Refer patient to mir community for assistance, as appropriate  - Respond to patient/family need for prayer/ritual/sacrament/ceremony  Outcome: Progressing

## 2024-08-27 NOTE — PLAN OF CARE
Problem: PAIN - ADULT  Goal: Verbalizes/displays adequate comfort level or baseline comfort level  Description: Interventions:  - Encourage patient to monitor pain and request assistance  - Assess pain using appropriate pain scale  - Administer analgesics based on type and severity of pain and evaluate response  - Implement non-pharmacological measures as appropriate and evaluate response  - Consider cultural and social influences on pain and pain management  - Notify physician/advanced practitioner if interventions unsuccessful or patient reports new pain  Outcome: Progressing     Problem: INFECTION - ADULT  Goal: Absence or prevention of progression during hospitalization  Description: INTERVENTIONS:  - Assess and monitor for signs and symptoms of infection  - Monitor lab/diagnostic results  - Monitor all insertion sites, i.e. indwelling lines, tubes, and drains  - Monitor endotracheal if appropriate and nasal secretions for changes in amount and color  - [place] appropriate cooling/warming therapies per order  - Administer medications as ordered  - Instruct and encourage patient and family to use good hand hygiene technique  - Identify and instruct in appropriate isolation precautions for identified infection/condition  Outcome: Progressing  Goal: Absence of fever/infection during neutropenic period  Description: INTERVENTIONS:  - Monitor WBC    Outcome: Progressing     Problem: SAFETY ADULT  Goal: Patient will remain free of falls  Description: INTERVENTIONS:  - Educate patient/family on patient safety including physical limitations  - Instruct patient to call for assistance with activity   - Consult OT/PT to assist with strengthening/mobility   - Keep Call bell within reach  - Keep bed low and locked with side rails adjusted as appropriate  - Keep care items and personal belongings within reach  - Initiate and maintain comfort rounds  - Make Fall Risk Sign visible to staff  - Offer Toileting every PRN  Hours, in advance of need  - Apply yellow socks and bracelet for high fall risk patients  - Consider moving patient to room near nurses station  Outcome: Progressing  Goal: Maintain or return to baseline ADL function  Description: INTERVENTIONS:  -  Assess patient's ability to carry out ADLs; assess patient's baseline for ADL function and identify physical deficits which impact ability to perform ADLs (bathing, care of mouth/teeth, toileting, grooming, dressing, etc.)  - Assess/evaluate cause of self-care deficits   - Assess range of motion  - Assess patient's mobility; develop plan if impaired  - Assess patient's need for assistive devices and provide as appropriate  - Encourage maximum independence but intervene and supervise when necessary  - Involve family in performance of ADLs  - Assess for home care needs following discharge   - Consider OT consult to assist with ADL evaluation and planning for discharge  - Provide patient education as appropriate  Outcome: Progressing  Goal: Maintains/Returns to pre admission functional level  Description: INTERVENTIONS:  - Perform AM-PAC 6 Click Basic Mobility/ Daily Activity assessment daily.  - Set and communicate daily mobility goal to care team and patient/family/caregiver.   - Collaborate with rehabilitation services on mobility goals if consulted  - Perform Range of Motion PRN times a day.  - Reposition patient every PRN hours.  - Dangle patient PRN times a day  - Stand patient PRN times a day  - Ambulate patient PRN times a day  - Out of bed to chair PRN times a day   - Out of bed for meals PRN times a day  - Out of bed for toileting  - Record patient progress and toleration of activity level   Outcome: Progressing     Problem: Knowledge Deficit  Goal: Patient/family/caregiver demonstrates understanding of disease process, treatment plan, medications, and discharge instructions  Description: Complete learning assessment and assess knowledge base.  Interventions:  -  Provide teaching at level of understanding  - Provide teaching via preferred learning methods  Outcome: Progressing     Problem: DISCHARGE PLANNING  Goal: Discharge to home or other facility with appropriate resources  Description: INTERVENTIONS:  - Identify barriers to discharge w/patient and caregiver  - Arrange for needed discharge resources and transportation as appropriate  - Identify discharge learning needs (meds, wound care, etc.)  - Arrange for interpretive services to assist at discharge as needed  - Refer to Case Management Department for coordinating discharge planning if the patient needs post-hospital services based on physician/advanced practitioner order or complex needs related to functional status, cognitive ability, or social support system  Outcome: Progressing     Problem: COPING  Goal: Pt/Family able to verbalize concerns and demonstrate effective coping strategies  Description: INTERVENTIONS:  - Assist patient/family to identify coping skills, available support systems and cultural and spiritual values  - Provide emotional support, including active listening and acknowledgement of concerns of patient and caregivers  - Reduce environmental stimuli, as able  - Provide patient education  - Assess for spiritual pain/suffering and initiate spiritual care, including notification of Pastoral Care or mir based community as needed  - Assess effectiveness of coping strategies  Outcome: Progressing  Goal: Will report anxiety at manageable levels  Description: INTERVENTIONS:  - Administer medication as ordered  - Teach and encourage coping skills  - Provide emotional support  - Assess patient/family for anxiety and ability to cope  Outcome: Progressing     Problem: DEATH & DYING  Goal: Pt/Family communicate acceptance of impending death and expresses psychological comfort and peace  Description: INTERVENTIONS:  - Assess patient/family anxiety and grief process related to end of life issues  - Provide  emotional, spiritual and psychosocial support  - Provide information about the patient’s health status with consideration of family and cultural values  - Communicate willingness to discuss death and facilitate grief process  with patient/family as appropriate  - Emphasize sustaining relationships within family system and community, or mir/spiritual traditions  - Initiate Spiritual Care, Pastoral care or other ancillary consults as needed  - Refer to community support groups as appropriate  Outcome: Progressing     Problem: CHANGE IN BODY IMAGE  Goal: Pt/Family communicate acceptance of loss or change in body image and expresses psychological comfort and peace  Description: INTERVENTIONS:  - Assess patient/family anxiety and grief process related to change in body image, loss of functional status and loss of sense of self  - Assess patient/family's coping skills and provide emotional, spiritual and psychosocial support  - Provide information about the patient's health status with consideration of family and cultural values  - Communicate willingness to discuss loss and facilitate grief process with patient/family as appropriate  - Emphasize sustaining relationships within family system and community, or mir/spiritual traditions  - Refer to community support groups as appropriate  - Initiate Spiritual Care, Pastoral care or other ancillary consults as needed  Outcome: Progressing     Problem: DECISION MAKING  Goal: Pt/Family able to effectively weigh alternatives and participate in decision making related to treatment and care  Description: INTERVENTIONS:  - Identify decision maker  - Determine when there are differences among patient's view, family's view, and healthcare provider's view of patient condition and care goals  - Facilitate patient/family articulation of goals for care  - Help patient/family identify pros/cons of alternative solutions  - Provide information as requested by patient/family  - Respect  patient/family rights related to privacy and sharing information   - Serve as a liaison between patient, family and health care team  - Initiate consults as appropriate (Ethics Team, Palliative Care, Family Care Conference, etc.)  Outcome: Progressing     Problem: CONFUSION/THOUGHT DISTURBANCE  Goal: Thought disturbances (confusion, delirium, depression, dementia or psychosis) are managed to maintain or return to baseline mental status and functional level  Description: INTERVENTIONS:  - Assess for possible contributors to  thought disturbance, including but not limited to medications, infection, impaired vision or hearing, underlying metabolic abnormalities, dehydration, respiratory compromise,  psychiatric diagnoses and notify attending PHYSICAN/AP  - Monitor and intervene to maintain adequate nutrition, hydration, elimination, sleep and activity  - Decrease environmental stimuli, including noise as appropriate.  - Provide frequent contacts to provide refocusing, direction and reassurance as needed. Approach patient calmly with eye contact and at their level.  - [place] high risk fall precautions, aspiration precautions and other safety measures, as indicated  - If delirium suspected, notify physician/AP of change in condition and request immediate in-person evaluation  - Pursue consults as appropriate including Geriatric (campus dependent), OT for cognitive evaluation/activity planning, psychiatric, pastoral care, etc.  Outcome: Progressing     Problem: BEHAVIOR  Goal: Pt/Family maintain appropriate behavior and adhere to behavioral management agreement, if implemented  Description: INTERVENTIONS:  - Assess the family dynamic   - Encourage verbalization of thoughts and concerns in a socially appropriate manner  - Assess patient/family's coping skills and non-compliant behavior (including use of illegal substances).  - Utilize positive, consistent limit setting strategies supporting safety of patient, staff and  others  - Initiate consult with Case Management, Spiritual Care or other ancillary services as appropriate  - If a patient's/visitor's behavior jeopardizes the safety of the patient, staff, or others, refer to organization procedure.   - Notify Security of behavior or suspected illegal substances which indicate the need for search of the patient and/or belongings  - Encourage participation in the decision making process about a behavioral management agreement; implement if patient meets criteria  Outcome: Progressing     Problem: SELF HARM  Goal: Effect of psychiatric condition will be minimized and patient will be protected from self harm  Description: INTERVENTIONS:  - Assess impact of patient's symptoms on level of functioning, self-care needs and offer support as indicated  - Assess patient/family knowledge of depression, impact on illness and need for teaching  - Provide emotional support, presence and reassurance  - Assess for possible suicidal thoughts, ideation or self-harm. If patient expresses suicidal thoughts or statements do not leave alone, notify physician/AP immediately, initiate suicide precautions, and determine need for continual observation.  - initiate consults and referrals as appropriate (a mental health professional, Spiritual Care  Outcome: Progressing     Problem: ABUSE/NEGLECT  Goal: Pt/Caregiver/Family aware of resources to assist with issues of abuse and neglect  Description: INTERVENTIONS:  - If child abuse and/or neglect is suspected, notify Childline directly  - Assess for level of risk and safety  - Initiate referral to Case Management  - Notify PHYSICIAN/AP and Nursing Supervisor  - Provide appropriate education and resources to patient and/or family  - Initiate referral to age appropriate protective services, i.e. [facility] [employer] [facility] or Child Protective Services  - Offer patient/caregiver the option to Opt Out of patient information directory  - Provide emotional support,  including active listening and acknowledgment of concerns  - Provide the patient with information about supportive services i.e. shelters, community resources for domestic violence  Outcome: Progressing     Problem: SPIRITUAL CARE  Goal: Pt/Family able to move forward in process of forgiving self, others and/or higher power  Description: INTERVENTIONS:  - Assist patient with any spiritual needs/requests such as communion, confession, anointing, etc  - Explore guilt and help patient/family identify possible spiritual/cultural beliefs and values  - Explore possibilities of making amends & reconciliation with self, others, and/or a greater power  - Guide patient/family in identifying painful feelings  - Help patient explore and identify spiritual beliefs, cultural understandings or values that may help or hinder letting go of issue  - Help patient explore feelings of anger, bitterness, resentment, anxiety   Help patient/family identify and examine the situation in which these feelings are experienced  - Help patient/family identify destructive displacement of feelings onto other individuals  - Refer patient to formal counseling and/or to [facility] for further support as needed or per request  Outcome: Progressing  Goal: Patient feels balance and connection with others and/or higher power that empowers the self during times of loss, guilt and fear  Description: INTERVENTIONS:  - Create safety for patient through empathic presence and non-judgmental listening  - Encourage patient to explore his/her values, beliefs and/or spiritual images and practices  - Encourage use of breath work, imagery, meditation, relaxation, reiki to ease distress and provide healing  - Encourage use of cultural and spiritual celebrations and rituals  - Facilitate discussion that helps patient sort out spiritual concerns  - Help patient identify where meaning/hope/comfort & strength are in his/her life  - Refer patient to [facility]  for assistance, as appropriate  - Respond to patient/family need for prayer/ritual/sacrament/ceremony  Outcome: Progressing

## 2024-08-27 NOTE — H&P
H & P- Obstetrics   Cathy Marks 28 y.o. female MRN: [id number]  Unit/Bed#: -01 Encounter: [id number]    **LATE ENTRY DUE TO PATIENT ACUITY**    Assessment: 28 y.o.  at 22w5d admitted for omero-viable  labor.    Plan:    labor in second trimester  Assessment & Plan  Cervix found to be dilated with fetal parts visualized coming from the cervical os on speculum exam   Likely from placental abruption based on delivery of placenta with the baby and retroplacental clot  Coags wnl, fibrinogen 466, KB in process    22 weeks gestation of pregnancy  Assessment & Plan  Unremarkable prenatal care with the exception of a trich infection in  with a negative KEITH  MFM scan at 20w2d (24): mid placental lake measuring 4.49 x 1.16 x 2.87cm, cervical length 4.03 cm  FHT was appropriate for her GA with moderate variability but then started having recurrent variable decels which increased in frequency  FHR was confirmed on TAUS, fetal position was vertex  Based on exam findings, patient was counseled about our concern for rapidly progressing  labor and the option for STAT classical  delivery for fetal benefit if she desired the full extent of fetal resuscitation efforts  We also explained the alternative would be to allow her labor to proceed with the understanding that this would likely further decrease the likelihood of fetal survival  Due to concern for ongoing PTL and periviable GA  At that time, she did not feel strongly about attempting a CS, NICU was contacted urgently for additional counseling and the patient briefly discussed risks/benefits with the neonatologist as we moved her to a labor room  Magnesium and betamethasone were administered as soon as possible, see delivery note for further documentation          Discussed case and plan w/ Dr. Heller      Chief Complaint: vaginal bleeding    HPI: Cathy Marks is a 28 y.o.  with an ELLEN of 2024, by Last  "Menstrual Period at 22w5d who is being admitted for omero-viable  labor. She was seen in the [place] ED yesterday due to hematuria and discharged with antibiotics for a UTI. Today she developed worsening cramping, occurring every 2 minutes and passage of clots. She states there is decreased FM and thinks she \"pushed the baby out into the toilet when she went pee but isn't sure.\"    Patient Active Problem List   Diagnosis    Moderate episode of recurrent major depressive disorder (HCC)    Obesity complicating pregnancy in second trimester    22 weeks gestation of pregnancy     delivery after  section     labor in second trimester       Baby complications/comments: none    Review of Systems   Constitutional:  Negative for chills and fever.   HENT:  Negative for ear pain and sore throat.    Eyes:  Negative for pain and visual disturbance.   Respiratory:  Negative for cough and shortness of breath.    Cardiovascular:  Negative for chest pain and palpitations.   Gastrointestinal:  Negative for abdominal pain, nausea and vomiting.   Genitourinary:  Positive for pelvic pain and vaginal bleeding. Negative for dysuria and hematuria.   Musculoskeletal:  Negative for arthralgias and back pain.   Skin:  Negative for color change and rash.   Neurological:  Negative for seizures, syncope and headaches.   All other systems reviewed and are negative.      OB Hx:  OB History    Para Term  AB Living   3 2 1 1 1 1   SAB IAB Ectopic Multiple Live Births   1     0 1      # Outcome Date GA Lbr Danny/2nd Weight Sex Type Anes PTL Lv   3  24 22w5d / 00:01 495 g (1 lb 1.5 oz) F Vag-Spont   FD      Complications: Abruptio Placenta   2 SAB            1 Term 14 39w0d  3175 g (7 lb) M CS-LTranv Spinal N JAMI       Past Medical Hx:  Past Medical History:   Diagnosis Date    Acute pain of right knee 2023    Asthma     childhood    Depression     Miscarriage     Obesity (BMI " 30-39.9)        Past Surgical hx:  Past Surgical History:   Procedure Laterality Date     SECTION  2014    MOUTH SURGERY N/A        Social Hx:  Alcohol use: No  Tobacco use: No  Other substance use: No    Allergies   Allergen Reactions    Cefdinir Other (See Comments)     Unknown - childhood    Ceftin [Cefuroxime] Other (See Comments)     Unknown - childhood    Dust Mite Extract     Nystatin Other (See Comments)     Unknown - childhood    Pollen Extract          Medications Prior to Admission:     nitrofurantoin (MACROBID) 100 mg capsule    Prenatal Vit-Fe Fumarate-FA (Prenatal Vitamin) 27-0.8 MG TABS    Objective:  Temp:  [98.4 °F (36.9 °C)] 98.4 °F (36.9 °C)  HR:  [56-91] 56  Resp:  [18] 18  BP: ()/(51-77) 100/55  There is no height or weight on file to calculate BMI.     Physical Exam:  Physical Exam  Constitutional:       Appearance: Normal appearance.   Genitourinary:      Vulva normal.   Pulmonary:      Effort: Pulmonary effort is normal. No respiratory distress.   Abdominal:      Palpations: Abdomen is soft.      Tenderness: There is no abdominal tenderness.   Neurological:      Mental Status: She is alert.   Skin:     General: Skin is warm and dry.   Psychiatric:         Mood and Affect: Mood normal.         Behavior: Behavior normal.   Vitals reviewed.            FHT:  Baseline Rate (FHR): 145 bpm  Variability: Moderate  Accelerations: 10 x 10 (<32 weeks), At variable times  Decelerations: Variable, Rik <70 bpm, For < 60 seconds  FHR Category: Category [id number]    TOCO:   Regular contractions q3    Lab Results   Component Value Date    WBC 10.44 (H) 2024    HGB 10.7 (L) 2024    HCT 32.0 (L) 2024     2024     Lab Results   Component Value Date    K 4.0 2024     2024    CO2 22 2024    BUN 6 2024    CREATININE 0.61 2024    AST 20 2024    ALT 12 2024     Prenatal Labs: Reviewed      Blood type: B positive  Antibody:  Negative  GBS: pending  HIV: Non reactive  Rubella: Immune  Syphilis IgM/IgG: Non-reactive  HBsAg: Non-reactive  HCAb: Non-reactive  Chlamydia: Negative  Gonorrhea: Negative  Diabetes 1 hour screen: pending  3 hour glucose: n/a  Platelets: 223  Hgb: 12.6    <2 Midnights  INPATIENT     Signature/Title: Coleen Obando MD  Date: 8/27/2024  Time: 4:54 AM

## 2024-08-27 NOTE — PROGRESS NOTES
Progress Note - OB/GYN   Cathy Marks 28 y.o. female MRN: [id number]  Unit/Bed#:  209-01 Encounter: [id number]      Assessment/Plan    Cathy Marks is a 28 y.o.  who is PPD 1 s/p  at 22w5d due to  labor, likely placental abruption with peripartum fetal demise     labor in second trimester  Assessment & Plan  Likely from placental abruption based on delivery of placenta with the baby and retroplacental clot  Coags wnl, fibrinogen 466, KB in process  Follow up additional labs     delivery after  section  Assessment & Plan  Grief support  Encourage ambulation  Pain control with motrin and tylenol       Disposition: Anticipate discharge home postpartum Day #1-2  Barriers to discharge: bleeding, pain      Subjective/Objective     Subjective: Postpartum state    Pain: no  Tolerating PO: yes  Voiding: yes  Flatus: no  BM: no  Ambulating: yes  Chest pain: no  Shortness of breath: no  Leg pain: no  Lochia: minimal    Objective:     Vitals:  Vitals:    24 0230 24 0245 24 0300 24 0500   BP: 102/55 105/57 100/55 107/54   Pulse: 67 65 56 (!) 52   Resp:       Temp:       TempSrc:       SpO2:           Physical Exam:   GEN: appears tired and sad, alert and oriented x 3, pleasant and cooperative   CV: Regular rate and rhythm  RESP: non labored breathing, lungs clear to auscultation  ABDOMEN: soft, no tenderness, no distention, Uterine fundus firm and non-tender, below umbilicus  EXTREMITIES: non-tender  NEURO Alert and oriented to person, place, and time.       Lab Results   Component Value Date    WBC 10.44 (H) 2024    HGB 10.7 (L) 2024    HCT 32.0 (L) 2024    MCV 83 2024     2024         Margot Vila MD  Obstetrics & Gynecology  24

## 2024-08-28 ENCOUNTER — TRANSITIONAL CARE MANAGEMENT (OUTPATIENT)
Age: 29
End: 2024-08-28

## 2024-08-28 NOTE — UTILIZATION REVIEW
"  Fetal Demise      NOTIFICATION OF INPATIENT ADMISSION   MATERNITY/DELIVERY AUTHORIZATION REQUEST   SERVICING FACILITY:   [facility]  Parent Child Health - L&D, , NICU  [address] [place] [address]  Tax ID: [id number]  NPI: [id number]   ATTENDING PROVIDER:  Attending Name and NPI#: Denisha Heller Md [[id number]]  Address: [address] [place] [address]  Phone: [phone]   ADMISSION INFORMATION:  Place of Service: Inpatient [facility]  Place of Service Code: 21  Inpatient Admission Date/Time: 24  9:24 PM  Discharge Date/Time: 2024 12:07 PM  Admitting Diagnosis Code/Description:  Vaginal bleeding [N93.9]  Fetal demise before 22 weeks with retention of dead fetus [O36.4XX0]     Mother: Cathy Marks 1995 Estimated Date of Delivery: 24  Delivering clinician: Denisha Heller   OB History          3    Para   2    Term   1       1    AB   1    Living   1         SAB   1    IAB        Ectopic        Multiple   0    Live Births   1               [facility] Name & MRN:   Information for the patient's :  Kendrick, Baby Girl (Cathy) FD [[id number]]   [facility] Delivery Information:  Sex: female  Delivered 2024 9:41 PM by Vaginal, Spontaneous; Gestational Age: 22w5d    [facility] Measurements:  Weight: 1 lb 1.5 oz (495 g);  Height: 11.5\"    APGAR 1 minute 5 minutes 10 minutes   Totals: 0 0 0      UTILIZATION REVIEW CONTACT:  Elvia Reid Utilization   Network Utilization Review Department  Phone: [phone]  Fax [phone]  Email: [email or web address]  Contact for approvals/pending authorizations, clinical reviews, and discharge.     PHYSICIAN ADVISORY SERVICES:  Medical Necessity Denial & Gxhq-vs-Eura Review  Phone: [phone]  Fax: [phone]  Email: [email or web address]     DISCHARGE SUPPORT TEAM:  For Patients Discharge Needs & Updates  Phone: [phone] opt. 2 " Fax: [phone]  Email: [email or web address]          NOTIFICATION OF ADMISSION DISCHARGE   This is a Notification of Discharge from [facility]. Please be advised that this patient has been discharge from our facility. Below you will find the admission and discharge date and time including the patient’s disposition.   UTILIZATION REVIEW CONTACT:  Elvia Reid  Utilization   Network Utilization Review Department  Phone: [phone] x carefully listen to the prompts. All voicemails are confidential.  Email: [email or web address]     ADMISSION INFORMATION  PRESENTATION DATE: 8/26/2024  7:15 PM  OBERVATION ADMISSION DATE: N/A  INPATIENT ADMISSION DATE: 8/26/24  9:24 PM   DISCHARGE DATE: 8/27/2024 12:07 PM   DISPOSITION:Home/Self Care    Network Utilization Review Department  ATTENTION: Please call with any questions or concerns to [phone] and carefully listen to the prompts so that you are directed to the right person. All voicemails are confidential.   For Discharge needs, contact Care Management DC Support Team at [phone] opt. 2  Send all requests for admission clinical reviews, approved or denied determinations and any other requests to dedicated fax number below belonging to the campus where the patient is receiving treatment. List of dedicated fax numbers for the Facilities:  FACILITY NAME UR FAX NUMBER   ADMISSION DENIALS (Administrative/Medical Necessity) [phone]   DISCHARGE SUPPORT TEAM ([facility]) [phone]   PARENT CHILD HEALTH (Maternity/NICU/Pediatrics) [phone]   [facility] [phone]   [facility] [phone]   [facility] [phone]   [facility] [phone]   [facility] [phone]   [facility] [phone]   [facility] [phone]    [facility] [phone]   [facility] [phone]   [facility] [phone]   [facility] [phone]   [facility] [phone]

## 2024-08-29 PROCEDURE — 88305 TISSUE EXAM BY PATHOLOGIST: CPT | Performed by: PATHOLOGY

## 2024-08-30 ENCOUNTER — TELEPHONE (OUTPATIENT)
Age: 29
End: 2024-08-30

## 2024-08-30 ENCOUNTER — OFFICE VISIT (OUTPATIENT)
Age: 29
End: 2024-08-30

## 2024-08-30 VITALS
HEIGHT: 61 IN | OXYGEN SATURATION: 98 % | BODY MASS INDEX: 32.28 KG/M2 | SYSTOLIC BLOOD PRESSURE: 124 MMHG | TEMPERATURE: 98.2 F | RESPIRATION RATE: 16 BRPM | WEIGHT: 171 LBS | DIASTOLIC BLOOD PRESSURE: 79 MMHG | HEART RATE: 84 BPM

## 2024-08-30 DIAGNOSIS — R21 RASH AND NONSPECIFIC SKIN ERUPTION: ICD-10-CM

## 2024-08-30 DIAGNOSIS — R21 RASH AND NONSPECIFIC SKIN ERUPTION: Primary | ICD-10-CM

## 2024-08-30 DIAGNOSIS — O03.9 MISCARRIAGE: ICD-10-CM

## 2024-08-30 DIAGNOSIS — F43.21 GRIEF ASSOCIATED WITH LOSS OF FETUS: ICD-10-CM

## 2024-08-30 PROCEDURE — 99496 TRANSJ CARE MGMT HIGH F2F 7D: CPT | Performed by: FAMILY MEDICINE

## 2024-08-30 RX ORDER — CLINDAMYCIN HYDROCHLORIDE 75 MG/1
150 CAPSULE ORAL 4 TIMES DAILY
Qty: 40 CAPSULE | Refills: 0 | Status: SHIPPED | OUTPATIENT
Start: 2024-08-30 | End: 2024-09-04

## 2024-08-30 RX ORDER — CALAMINE
LOTION (ML) TOPICAL AS NEEDED
Qty: 180 ML | Refills: 0 | Status: SHIPPED | OUTPATIENT
Start: 2024-08-30 | End: 2024-09-09

## 2024-08-30 RX ORDER — CLINDAMYCIN HYDROCHLORIDE 75 MG/1
150 CAPSULE ORAL 4 TIMES DAILY
Qty: 40 CAPSULE | Refills: 0 | Status: SHIPPED | OUTPATIENT
Start: 2024-08-30 | End: 2024-08-30

## 2024-08-30 RX ORDER — CLINDAMYCIN HYDROCHLORIDE 75 MG/1
75 CAPSULE ORAL 4 TIMES DAILY
Qty: 20 CAPSULE | Refills: 0 | Status: SHIPPED | OUTPATIENT
Start: 2024-08-30 | End: 2024-08-30

## 2024-08-30 NOTE — TELEPHONE ENCOUNTER
Hi,    Patient just called and stated that [facility] in [place] is out of the Clindamycin 75mg (script that was sent over today)    Can this be resent to:  [facility]?    Thanks!

## 2024-08-30 NOTE — PROGRESS NOTES
Transition of Care Follow-up After Hospitalization  [facility]  Assessment/Plan:     1. Rash and nonspecific skin eruption  Assessment & Plan:  Per patient she has a bug bite possibly while in her backyard 5 days ago. She did not feel a bite however later saw a red lesion. The lesion has progressively enlarged, no associated, aggravating or relieving factors.   Erythematous raised tender non fluctuant lesion on lateral left leg below knee which has a center darker than border with no central indentation/ punctum is present. No pus or discharge. No other similar lesions on body.    No recent travel, different food exposure, or sick contacts. Both her son and dog are not affected. No fever,chills, fatigue, joint pain, facial assymetry/ droop.     Plan  Ordered Lyme Panel  Ordered Calamine Lotion, Clindamycin 150 mg Q6 x 5 days (Patient has had previous reaction to Benadryl, Nystatin, Ceftin)  Advised to go to ED if onset of fever with progression in swelling    Orders:  -     Lyme Total AB W Reflex to IGM/IGG; Future  -     calamine lotion; Apply topically as needed for itching for up to 10 days  -     Lyme Total AB W Reflex to IGM/IGG  -     clindamycin (CLEOCIN) 75 MG capsule; Take 2 capsules (150 mg total) by mouth 4 (four) times a day for 5 days  2. Grief associated with loss of fetus  Assessment & Plan:  Patient is grieving, and does not feel ready to talk about it yet but is thankful for the support. Patient has social support of son and dog. She is starting to go back to work on 24. No signs of suicidal ideation or intent. No access to firearms. Patient does not drink alcohol or smoke.     Plan  Referral for Talk Therapy (Psychology consult)  Social Work Management Referral     3. Miscarriage  -     Ambulatory referral to Psych Services; Future        No follow-ups on file.      HPI:  27 yo Cathy Marks with PMH of  (Miscarriage) at 22 weeks gestation who has come to the clinic for  28-Jul-2023 22:42 trasitional care management. Patient is afebrile, AAOx3 and vitally stable.No discharge, fever, fatigue, chills, inflamed/ painful abdomen/ vulval region. Patient requested therapy consult as she would like to speak to a therapist (talk therapy). She also had a progressively enlarging erythematous red lesion possibly localized cellulitis since 1 week. Lesion is red, raised, tender, non fluctuant and does not have any discharge or punctum. No recent contact, son and dog are unaffected. Patient has no joint pain, or facial droop associated. No other complaints.     The following portions of the patient's history were reviewed and updated as appropriate: allergies, current medications, past family history, past medical history, past social history, past surgical history, and problem list.     Review of Systems   Constitutional: Negative.  Negative for chills and fever.   HENT:  Negative for ear pain and sore throat.    Eyes: Negative.  Negative for pain and visual disturbance.   Respiratory: Negative.  Negative for cough and shortness of breath.    Cardiovascular: Negative.  Negative for chest pain and palpitations.   Gastrointestinal:  Negative for abdominal pain and vomiting.   Endocrine: Negative.    Genitourinary: Negative.  Negative for dysuria and hematuria.   Musculoskeletal: Negative.  Negative for arthralgias and back pain.   Skin:  Negative for color change and rash.        Red Raised Circular (6cm in diameter) lesion on left lateral leg below knee.    Allergic/Immunologic: Negative.    Neurological: Negative.  Negative for seizures and syncope.   Hematological: Negative.    Psychiatric/Behavioral:  Negative for agitation, behavioral problems, decreased concentration, dysphoric mood, hallucinations, self-injury, sleep disturbance and suicidal ideas. The patient is not nervous/anxious.    All other systems reviewed and are negative.       Physical Exam:  /79 (BP Location: Left arm, Patient Position:  "Sitting, Cuff Size: Adult)   Pulse 84   Temp 98.2 °F (36.8 °C) (Tympanic)   Resp 16   Ht 5' 1\" (1.549 m)   Wt 77.6 kg (171 lb)   LMP 03/20/2024 (Exact Date)   SpO2 98%   Breastfeeding No   BMI 32.31 kg/m²      Physical Exam  Constitutional:       Appearance: Normal appearance. She is normal weight.   HENT:      Head: Normocephalic and atraumatic.      Right Ear: External ear normal.      Left Ear: External ear normal.      Nose: Nose normal.      Mouth/Throat:      Mouth: Mucous membranes are moist.   Eyes:      Conjunctiva/sclera: Conjunctivae normal.      Pupils: Pupils are equal, round, and reactive to light.   Cardiovascular:      Rate and Rhythm: Normal rate and regular rhythm.      Pulses: Normal pulses.   Pulmonary:      Effort: Pulmonary effort is normal.      Breath sounds: Normal breath sounds.   Abdominal:      General: Abdomen is flat. Bowel sounds are normal.   Musculoskeletal:         General: Normal range of motion.      Cervical back: Normal range of motion.   Skin:     General: Skin is warm.      Capillary Refill: Capillary refill takes less than 2 seconds.      Comments: Erythematous raised tender non fluctuant lesion on lateral left leg below knee which has a center darker than border with no central indentation/ punctum. No pus or discharge. No other similar lesions on body.   Neurological:      General: No focal deficit present.      Mental Status: She is alert and oriented to person, place, and time. Mental status is at baseline.   Psychiatric:         Mood and Affect: Mood normal.         Behavior: Behavior normal.         Thought Content: Thought content normal.         Judgment: Judgment normal.          Labs/Images: I have reviewed pertinent labs and images completed during the hospital stay    TCM Call       Date and time call was made  8/28/2024  3:44 PM    Hospital care reviewed  Records reviewed    Patient was hospitialized at  [facility]    Date of Admission  08/26/24 "    Date of discharge  24    Diagnosis      Disposition  Home    Current Symptoms  None          TCM Call       Post hospital issues  None    Scheduled for follow up?  Yes    I have advised the patient to call PCP with any new or worsening symptoms  Mariela Garcia LOULOU          Hospital records were reviewed. Medications upon discharge reviewed/updated.   Discharge Disposition: 24  Follow up visits with other specialists: Psychology, Social Work, PCP       ** Please Note: This note has been constructed using a voice recognition system **     Lesly Small MD  24     Well-developed, well nourished

## 2024-08-30 NOTE — ASSESSMENT & PLAN NOTE
Per patient she has a bug bite possibly while in her backyard 5 days ago. She did not feel a bite however later saw a red lesion. The lesion has progressively enlarged, no associated, aggravating or relieving factors.   Erythematous raised tender non fluctuant lesion on lateral left leg below knee which has a center darker than border with no central indentation/ punctum is present. No pus or discharge. No other similar lesions on body.    No recent travel, different food exposure, or sick contacts. Both her son and dog are not affected. No fever,chills, fatigue, joint pain, facial assymetry/ droop.     Plan  Ordered Lyme Panel  Ordered Calamine Lotion, Clindamycin 150 mg Q6 x 5 days (Patient has had previous reaction to Benadryl, Nystatin, Ceftin)  Advised to go to ED if onset of fever with progression in swelling

## 2024-08-30 NOTE — TELEPHONE ENCOUNTER
The writer contacted the patient to verify the need for services. Upon review, the patient was referred to outside resources due to her insurance being OON.

## 2024-08-30 NOTE — ASSESSMENT & PLAN NOTE
Patient is grieving, and does not feel ready to talk about it yet but is thankful for the support. Patient has social support of son and dog. She is starting to go back to work on 9/2/24. No signs of suicidal ideation or intent. No access to firearms. Patient does not drink alcohol or smoke.     Plan  Referral for Talk Therapy (Psychology consult)  Social Work Management Referral

## 2024-09-05 ENCOUNTER — APPOINTMENT (EMERGENCY)
Dept: RADIOLOGY | Facility: HOSPITAL | Age: 29
End: 2024-09-05
Payer: COMMERCIAL

## 2024-09-05 ENCOUNTER — HOSPITAL ENCOUNTER (EMERGENCY)
Facility: HOSPITAL | Age: 29
Discharge: HOME | End: 2024-09-05
Attending: EMERGENCY MEDICINE
Payer: COMMERCIAL

## 2024-09-05 VITALS
HEART RATE: 70 BPM | BODY MASS INDEX: 32.28 KG/M2 | TEMPERATURE: 99.2 F | DIASTOLIC BLOOD PRESSURE: 49 MMHG | OXYGEN SATURATION: 98 % | RESPIRATION RATE: 18 BRPM | SYSTOLIC BLOOD PRESSURE: 101 MMHG | WEIGHT: 171 LBS | HEIGHT: 61 IN

## 2024-09-05 DIAGNOSIS — R10.2 PELVIC PAIN: Primary | ICD-10-CM

## 2024-09-05 DIAGNOSIS — R11.0 NAUSEA: ICD-10-CM

## 2024-09-05 LAB
ALBUMIN SERPL BCG-MCNC: 3.4 G/DL (ref 3.5–5)
ALP SERPL-CCNC: 65 U/L (ref 34–104)
ALT SERPL W P-5'-P-CCNC: 13 U/L (ref 7–52)
ANION GAP SERPL CALCULATED.3IONS-SCNC: 7 MMOL/L (ref 4–13)
AST SERPL W P-5'-P-CCNC: 14 U/L (ref 13–39)
B-HCG SERPL-ACNC: 66.6 MIU/ML (ref 0–5)
BACTERIA UR QL AUTO: ABNORMAL /HPF
BILIRUB SERPL-MCNC: 0.56 MG/DL (ref 0.2–1)
BILIRUB UR QL STRIP: NEGATIVE
BUN SERPL-MCNC: 7 MG/DL (ref 5–25)
CALCIUM ALBUM COR SERPL-MCNC: 9.1 MG/DL (ref 8.3–10.1)
CALCIUM SERPL-MCNC: 8.6 MG/DL (ref 8.4–10.2)
CHLORIDE SERPL-SCNC: 104 MMOL/L (ref 96–108)
CLARITY UR: CLEAR
CO2 SERPL-SCNC: 24 MMOL/L (ref 21–32)
COLOR UR: YELLOW
CREAT SERPL-MCNC: 0.6 MG/DL (ref 0.6–1.3)
ERYTHROCYTE [DISTWIDTH] IN BLOOD BY AUTOMATED COUNT: 15.6 % (ref 11.6–15.1)
GFR SERPL CREATININE-BSD FRML MDRD: 124 ML/MIN/1.73SQ M
GLUCOSE SERPL-MCNC: 90 MG/DL (ref 65–140)
GLUCOSE UR STRIP-MCNC: NEGATIVE MG/DL
HCT VFR BLD AUTO: 30.4 % (ref 34.8–46.1)
HGB BLD-MCNC: 9.9 G/DL (ref 11.5–15.4)
HGB UR QL STRIP.AUTO: ABNORMAL
KETONES UR STRIP-MCNC: NEGATIVE MG/DL
LEUKOCYTE ESTERASE UR QL STRIP: ABNORMAL
MCH RBC QN AUTO: 27 PG (ref 26.8–34.3)
MCHC RBC AUTO-ENTMCNC: 32.6 G/DL (ref 31.4–37.4)
MCV RBC AUTO: 83 FL (ref 82–98)
MUCOUS THREADS UR QL AUTO: ABNORMAL
NITRITE UR QL STRIP: NEGATIVE
NON-SQ EPI CELLS URNS QL MICRO: ABNORMAL /HPF
PH UR STRIP.AUTO: 5.5 [PH]
PLATELET # BLD AUTO: 212 THOUSANDS/UL (ref 149–390)
PMV BLD AUTO: 9.2 FL (ref 8.9–12.7)
POTASSIUM SERPL-SCNC: 4.2 MMOL/L (ref 3.5–5.3)
PROT SERPL-MCNC: 7.1 G/DL (ref 6.4–8.4)
PROT UR STRIP-MCNC: ABNORMAL MG/DL
RBC # BLD AUTO: 3.66 MILLION/UL (ref 3.81–5.12)
RBC #/AREA URNS AUTO: ABNORMAL /HPF
SODIUM SERPL-SCNC: 135 MMOL/L (ref 135–147)
SP GR UR STRIP.AUTO: 1.01 (ref 1–1.03)
UROBILINOGEN UR STRIP-ACNC: <2 MG/DL
WBC # BLD AUTO: 17.82 THOUSAND/UL (ref 4.31–10.16)
WBC #/AREA URNS AUTO: ABNORMAL /HPF

## 2024-09-05 PROCEDURE — 85025 COMPLETE CBC W/AUTO DIFF WBC: CPT | Performed by: EMERGENCY MEDICINE

## 2024-09-05 PROCEDURE — 76856 US EXAM PELVIC COMPLETE: CPT

## 2024-09-05 PROCEDURE — 99284 EMERGENCY DEPT VISIT MOD MDM: CPT

## 2024-09-05 PROCEDURE — 80053 COMPREHEN METABOLIC PANEL: CPT | Performed by: EMERGENCY MEDICINE

## 2024-09-05 PROCEDURE — 84702 CHORIONIC GONADOTROPIN TEST: CPT | Performed by: EMERGENCY MEDICINE

## 2024-09-05 PROCEDURE — 36415 COLL VENOUS BLD VENIPUNCTURE: CPT | Performed by: EMERGENCY MEDICINE

## 2024-09-05 PROCEDURE — 81001 URINALYSIS AUTO W/SCOPE: CPT | Performed by: EMERGENCY MEDICINE

## 2024-09-05 PROCEDURE — 96374 THER/PROPH/DIAG INJ IV PUSH: CPT

## 2024-09-05 PROCEDURE — 99285 EMERGENCY DEPT VISIT HI MDM: CPT | Performed by: EMERGENCY MEDICINE

## 2024-09-05 RX ORDER — NAPROXEN 500 MG/1
500 TABLET ORAL 2 TIMES DAILY WITH MEALS
Qty: 30 TABLET | Refills: 0 | Status: SHIPPED | OUTPATIENT
Start: 2024-09-05

## 2024-09-05 RX ORDER — ACETAMINOPHEN 325 MG/1
650 TABLET ORAL ONCE
Status: DISCONTINUED | OUTPATIENT
Start: 2024-09-05 | End: 2024-09-05 | Stop reason: HOSPADM

## 2024-09-05 RX ORDER — ACETAMINOPHEN 325 MG/1
650 TABLET ORAL ONCE
Status: COMPLETED | OUTPATIENT
Start: 2024-09-05 | End: 2024-09-05

## 2024-09-05 RX ORDER — KETOROLAC TROMETHAMINE 30 MG/ML
15 INJECTION, SOLUTION INTRAMUSCULAR; INTRAVENOUS ONCE
Status: COMPLETED | OUTPATIENT
Start: 2024-09-05 | End: 2024-09-05

## 2024-09-05 RX ORDER — KETOROLAC TROMETHAMINE 30 MG/ML
15 INJECTION, SOLUTION INTRAMUSCULAR; INTRAVENOUS ONCE
Status: DISCONTINUED | OUTPATIENT
Start: 2024-09-05 | End: 2024-09-05

## 2024-09-05 RX ORDER — ONDANSETRON 4 MG/1
4 TABLET, FILM COATED ORAL EVERY 6 HOURS
Qty: 12 TABLET | Refills: 0 | Status: SHIPPED | OUTPATIENT
Start: 2024-09-05

## 2024-09-05 RX ADMIN — KETOROLAC TROMETHAMINE 15 MG: 30 INJECTION, SOLUTION INTRAMUSCULAR; INTRAVENOUS at 11:26

## 2024-09-05 RX ADMIN — ACETAMINOPHEN 650 MG: 325 TABLET ORAL at 13:34

## 2024-09-05 RX ADMIN — AMOXICILLIN AND CLAVULANATE POTASSIUM 1 TABLET: 875; 125 TABLET, FILM COATED ORAL at 14:17

## 2024-09-05 NOTE — DISCHARGE INSTRUCTIONS
Follow-up as planned with [facility] next week.  Per their recommendations we have started you on a 7 day course of the antibiotic Augmentin.  We have also given you prescription for Naprosyn for pain and Zofran for nausea.  If you have any severe worsening pain, heavy vaginal bleeding as evidenced by 2 soaked pads per hour for 2 consecutive hours, persistent fever or shaking chills, return to the emergency department.

## 2024-09-05 NOTE — ED PROVIDER NOTES
History  Chief Complaint   Patient presents with    Abdominal Pain     States she gave birth to a stillborn last week and now having increasing lower abd pain     HPI  Patient is a 28-year-old  status post recent spontaneous vaginal delivery of nonviable fetus at 22 weeks 5 days on 2024 presenting for evaluation of pelvic pain.  Patient states increased of foul-smelling vaginal discharge over the course of the last 2 days.  Patient states starting yesterday evening some subjective chills and now this morning sudden onset moderate to severe pelvic pain.  Patient denies any vaginal bleeding.  Patient denies dysuria, hematuria, flank pain, states some nausea without vomiting.  Patient states 1 loose nonbloody nonmelanotic bowel movement per day for the last few days.  Prior to Admission Medications   Prescriptions Last Dose Informant Patient Reported? Taking?   Prenatal Vit-Fe Fumarate-FA (Prenatal Vitamin) 27-0.8 MG TABS   Yes No   Sig: Take 1 capsule by mouth in the morning   Patient not taking: Reported on 2024   calamine lotion   No No   Sig: Apply topically as needed for itching for up to 10 days   clindamycin (CLEOCIN) 75 MG capsule   No No   Sig: Take 2 capsules (150 mg total) by mouth 4 (four) times a day for 5 days   ibuprofen (MOTRIN) 600 mg tablet   No No   Sig: Take 1 tablet (600 mg total) by mouth every 6 (six) hours as needed for moderate pain (cramping)   Patient not taking: Reported on 2024      Facility-Administered Medications: None       Past Medical History:   Diagnosis Date    Acute pain of right knee 2023    Asthma     childhood    Depression     Miscarriage     Obesity (BMI 30-39.9)        Past Surgical History:   Procedure Laterality Date     SECTION  2014    MOUTH SURGERY N/A        Family History   Problem Relation Age of Onset    Asthma Mother     Other Mother     Diabetes Mother     Deep vein thrombosis Mother     Heart attack Mother     Migraines Mother      Seizures Mother     No Known Problems Father     No Known Problems Brother     Heart disease Maternal Grandmother     Other Maternal Grandmother      I have reviewed and agree with the history as documented.    E-Cigarette/Vaping    E-Cigarette Use Never User      E-Cigarette/Vaping Substances    Nicotine No     THC No     CBD No     Flavoring No     Other No     Unknown No      Social History     Tobacco Use    Smoking status: Never     Passive exposure: Past    Smokeless tobacco: Never   Vaping Use    Vaping status: Never Used   Substance Use Topics    Alcohol use: Not Currently     Comment: rarely, every few months     Drug use: No       Review of Systems   Constitutional:  Positive for chills. Negative for fatigue and fever.   Respiratory:  Negative for cough and shortness of breath.    Gastrointestinal:  Positive for abdominal pain and nausea. Negative for diarrhea and vomiting.   Genitourinary:  Positive for pelvic pain. Negative for flank pain and frequency.   Musculoskeletal:  Negative for arthralgias and myalgias.   Neurological:  Negative for weakness, numbness and headaches.   Psychiatric/Behavioral:  Negative for confusion.    All other systems reviewed and are negative.      Physical Exam  Physical Exam  Vitals and nursing note reviewed.   Constitutional:       General: She is not in acute distress.     Appearance: Normal appearance. She is not ill-appearing, toxic-appearing or diaphoretic.      Comments: Well-appearing, nontoxic nondistressed   HENT:      Head: Normocephalic and atraumatic.      Comments: Moist mucous membranes     Right Ear: External ear normal.      Left Ear: External ear normal.   Eyes:      General:         Right eye: No discharge.         Left eye: No discharge.   Cardiovascular:      Comments: Regular rate and rhythm, no murmurs rubs or gallops.  Extremities warm and well-perfused without mottling  Pulmonary:      Effort: No respiratory distress.      Comments: No increased work  of breathing.  Speaking in complete sentences.  Lungs clear to auscultation bilaterally without wheezes, rales, rhonchi.  Satting 98% on room air indicating adequate oxygenation  Abdominal:      General: There is no distension.      Tenderness: There is abdominal tenderness.      Comments: Generalized abdominal tenderness most pronounced in the lower quadrants, specifically in the suprapubic area.  Abdomen nondistended with normal bowel sounds.  No rigidity, rebound, guarding   Musculoskeletal:         General: No deformity.      Cervical back: Normal range of motion.   Skin:     Findings: No lesion or rash.   Neurological:      Mental Status: She is alert and oriented to person, place, and time. Mental status is at baseline.      Comments: Awake, alert, pleasant, interactive   Psychiatric:         Mood and Affect: Mood and affect normal.         Vital Signs  ED Triage Vitals   Temperature Pulse Respirations Blood Pressure SpO2   09/05/24 1055 09/05/24 1055 09/05/24 1055 09/05/24 1055 09/05/24 1055   98.3 °F (36.8 °C) 95 18 127/60 98 %      Temp Source Heart Rate Source Patient Position - Orthostatic VS BP Location FiO2 (%)   09/05/24 1415 09/05/24 1415 09/05/24 1415 09/05/24 1415 --   Oral Monitor Lying Left arm       Pain Score       09/05/24 1055       7           Vitals:    09/05/24 1055 09/05/24 1100 09/05/24 1415   BP: 127/60 127/60 (!) 101/49   Pulse: 95 82 70   Patient Position - Orthostatic VS:   Lying         Visual Acuity      ED Medications  Medications   acetaminophen (TYLENOL) tablet 650 mg (has no administration in time range)   amoxicillin-clavulanate (AUGMENTIN) 875-125 mg per tablet 1 tablet (has no administration in time range)   ketorolac (TORADOL) injection 15 mg (15 mg Intravenous Given 9/5/24 1126)   acetaminophen (TYLENOL) tablet 650 mg (650 mg Oral Given 9/5/24 1334)       Diagnostic Studies  Results Reviewed       Procedure Component Value Units Date/Time    CBC and differential [[id number]]   (Abnormal) Collected: 09/05/24 1123    Lab Status: Final result Specimen: Blood from Arm, Right Updated: 09/05/24 1207     WBC 17.82 Thousand/uL      RBC 3.66 Million/uL      Hemoglobin 9.9 g/dL      Hematocrit 30.4 %      MCV 83 fL      MCH 27.0 pg      MCHC 32.6 g/dL      RDW 15.6 %      MPV 9.2 fL      Platelets 212 Thousands/uL     Urinalysis with microscopic [[id number]]  (Abnormal) Collected: 09/05/24 1123    Lab Status: Final result Specimen: Urine, Other Updated: 09/05/24 1159     Color, UA Yellow     Clarity, UA Clear     Specific Gravity, UA 1.015     pH, UA 5.5     Leukocytes, UA Large     Nitrite, UA Negative     Protein, UA Trace mg/dl      Glucose, UA Negative mg/dl      Ketones, UA Negative mg/dl      Urobilinogen, UA <2.0 mg/dl      Bilirubin, UA Negative     Occult Blood, UA Moderate     RBC, UA 4-10 /hpf      WBC, UA 30-50 /hpf      Epithelial Cells Occasional /hpf      Bacteria, UA Occasional /hpf      MUCUS THREADS Moderate    hCG, quantitative [[id number]]  (Abnormal) Collected: 09/05/24 1123    Lab Status: Final result Specimen: Blood from Arm, Right Updated: 09/05/24 1157     HCG, Quant 66.6 mIU/mL     Narrative:       Expected Ranges:    HCG results between 5.0 and 25.0 mIU/mL may be indicative of early pregnancy but should be interpreted in light of the total clinical presentation.    HCG can rise to detectable levels in omero and post menopausal women (0-11.6 mIU/mL).     Approximate               Approximate HCG  Gestation age          Concentration ( mIU/mL)  _____________          ______________________   Weeks                      HCG values  0.2-1                       5-50  1-2                           2-3                         100-5000  3-4                         500-52476  4-5                         1000-71651  5-6                         [id number]  6-8                         [id number]  8-12                        [id number]      Comprehensive metabolic panel  [[id number]]  (Abnormal) Collected: 09/05/24 1123    Lab Status: Final result Specimen: Blood from Arm, Right Updated: 09/05/24 1157     Sodium 135 mmol/L      Potassium 4.2 mmol/L      Chloride 104 mmol/L      CO2 24 mmol/L      ANION GAP 7 mmol/L      BUN 7 mg/dL      Creatinine 0.60 mg/dL      Glucose 90 mg/dL      Calcium 8.6 mg/dL      Corrected Calcium 9.1 mg/dL      AST 14 U/L      ALT 13 U/L      Alkaline Phosphatase 65 U/L      Total Protein 7.1 g/dL      Albumin 3.4 g/dL      Total Bilirubin 0.56 mg/dL      eGFR 124 ml/min/1.73sq m     Narrative:      [employer] guidelines for Chronic Kidney Disease (CKD):     Stage 1 with normal or high GFR (GFR > 90 mL/min/1.73 square meters)    Stage 2 Mild CKD (GFR = 60-89 mL/min/1.73 square meters)    Stage 3A Moderate CKD (GFR = 45-59 mL/min/1.73 square meters)    Stage 3B Moderate CKD (GFR = 30-44 mL/min/1.73 square meters)    Stage 4 Severe CKD (GFR = 15-29 mL/min/1.73 square meters)    Stage 5 End Stage CKD (GFR <15 mL/min/1.73 square meters)  Note: GFR calculation is accurate only with a steady state creatinine                   US pelvis transabdominal only   Final Result by Jimy Gary MD (09/05 1325)      Homogeneous endometrial complex without increased vascularity on transabdominal color Doppler evaluation. Note that transvaginal ultrasound is more sensitive for the detection of retained products of conception.      Normal transabdominal sonographic appearance of the ovaries.                              Workstation performed: [id number]                    Procedures  Procedures         ED Course                                 SBIRT 20yo+      Flowsheet Row Most Recent Value   Initial Alcohol Screen: US AUDIT-C     1. How often do you have a drink containing alcohol? 0 Filed at: 09/05/2024 1057   2. How many drinks containing alcohol do you have on a typical day you are drinking?  0 Filed at: 09/05/2024 1057   3a. Male UNDER 65: How  often do you have five or more drinks on one occasion? 0 Filed at: 2024 1057   3b. FEMALE Any Age, or MALE 65+: How often do you have 4 or more drinks on one occassion? 0 Filed at: 2024 1057   Audit-C Score 0 Filed at: 2024 1057   KIMBERLY: How many times in the past year have you...    Used an illegal drug or used a prescription medication for non-medical reasons? Never Filed at: 2024 1057                      Medical Decision Making  I obtained history from the patient.  I reviewed external medical documentation.  Patient went into  labor on 2024 at 22 weeks.  Patient subsequently delivered a nonviable fetus likely in the setting of placental abruption.    Concern primarily for retained products of conception at this time.  Differential diagnosis additionally includes but is not limited to cystitis, colitis.  I ordered and reviewed lab work including CBC, CMP, urinalysis.  I ordered and reviewed a pelvic ultrasound.  With a leukocytosis.  Lab work otherwise reassuring.  Ultrasound not strongly suggestive of retained products.  I discussed patient with Dr. Heller with OB/GYN who recommends initiation of Augmentin.  [facility].  Provided with prescription for Augmentin, Naprosyn, Zofran for nausea, discharged with return precautions.    Amount and/or Complexity of Data Reviewed  Labs: ordered.  Radiology: ordered.    Risk  OTC drugs.  Prescription drug management.                 Disposition  Final diagnoses:   Pelvic pain   Nausea     Time reflects when diagnosis was documented in both MDM as applicable and the Disposition within this note       Time User Action Codes Description Comment    2024  1:54 PM Costa Julio Add [R10.2] Pelvic pain     2024  1:55 PM Costa Julio Add [R11.0] Nausea           ED Disposition       ED Disposition   Discharge    Condition   Stable    Date/Time   Thu Sep  5, 2024 1354    Comment   Cathy Marks discharge to home/self care.                   Follow-up Information       Follow up With Specialties Details Why Contact Info Additional Information    [facility] Emergency Department Emergency Medicine  If symptoms worsen [address] [facility] [id number] [phone] [facility] Emergency Department, [address], [place], [address]            Patient's Medications   Discharge Prescriptions    AMOXICILLIN-CLAVULANATE (AUGMENTIN) 875-125 MG PER TABLET    Take 1 tablet by mouth every 12 (twelve) hours for 7 days       Start Date: 9/5/2024  End Date: 9/12/2024       Order Dose: 1 tablet       Quantity: 14 tablet    Refills: 0    NAPROXEN (NAPROSYN) 500 MG TABLET    Take 1 tablet (500 mg total) by mouth 2 (two) times a day with meals       Start Date: 9/5/2024  End Date: --       Order Dose: 500 mg       Quantity: 30 tablet    Refills: 0    ONDANSETRON (ZOFRAN) 4 MG TABLET    Take 1 tablet (4 mg total) by mouth every 6 (six) hours       Start Date: 9/5/2024  End Date: --       Order Dose: 4 mg       Quantity: 12 tablet    Refills: 0       No discharge procedures on file.    PDMP Review       None            ED Provider  Electronically Signed by             Costa Julio MD  09/05/24 [id number]

## 2024-11-20 ENCOUNTER — TELEPHONE (OUTPATIENT)
Dept: OBGYN CLINIC | Facility: CLINIC | Age: 29
End: 2024-11-20

## 2024-12-01 ENCOUNTER — APPOINTMENT (OUTPATIENT)
Dept: LAB | Facility: HOSPITAL | Age: 29
End: 2024-12-01
Payer: COMMERCIAL

## 2024-12-02 ENCOUNTER — RESULTS FOLLOW-UP (OUTPATIENT)
Age: 29
End: 2024-12-02

## 2024-12-02 DIAGNOSIS — A69.20 LYME DISEASE: Primary | ICD-10-CM

## 2024-12-02 RX ORDER — AZITHROMYCIN 500 MG/1
500 TABLET, FILM COATED ORAL DAILY
Qty: 14 TABLET | Refills: 0 | Status: SHIPPED | OUTPATIENT
Start: 2024-12-02 | End: 2024-12-03

## 2024-12-02 NOTE — PROGRESS NOTES
Attempted to call patient, patient did not receive so left voice message.   Explained lyme IgG and IgM panel positive.  Unsure of whether patient is pregnant or not so will avoid Doxycyline.   Unsure of allergy history so will avoid amoxicillin and cefuroxime.       Plan  Azithromycin 500 mg Daily x 14 days  Ordered ECG to rule out arrythmias  F/u in 3 weeks or earlier if symptoms/side effects of medication/infection occur

## 2024-12-03 RX ORDER — AZITHROMYCIN 500 MG/1
500 TABLET, FILM COATED ORAL DAILY
Qty: 10 TABLET | Refills: 0 | Status: SHIPPED | OUTPATIENT
Start: 2024-12-03 | End: 2024-12-13